# Patient Record
Sex: FEMALE | Race: BLACK OR AFRICAN AMERICAN | NOT HISPANIC OR LATINO
[De-identification: names, ages, dates, MRNs, and addresses within clinical notes are randomized per-mention and may not be internally consistent; named-entity substitution may affect disease eponyms.]

---

## 2019-04-03 ENCOUNTER — RESULT REVIEW (OUTPATIENT)
Age: 34
End: 2019-04-03

## 2022-02-20 ENCOUNTER — EMERGENCY (EMERGENCY)
Facility: HOSPITAL | Age: 37
LOS: 0 days | Discharge: HOME | End: 2022-02-20
Attending: EMERGENCY MEDICINE
Payer: COMMERCIAL

## 2022-02-20 VITALS
SYSTOLIC BLOOD PRESSURE: 123 MMHG | RESPIRATION RATE: 16 BRPM | HEIGHT: 66 IN | DIASTOLIC BLOOD PRESSURE: 84 MMHG | OXYGEN SATURATION: 97 % | HEART RATE: 84 BPM | WEIGHT: 136.91 LBS | TEMPERATURE: 99 F

## 2022-02-20 VITALS
OXYGEN SATURATION: 98 % | SYSTOLIC BLOOD PRESSURE: 119 MMHG | HEART RATE: 70 BPM | TEMPERATURE: 98 F | DIASTOLIC BLOOD PRESSURE: 79 MMHG | RESPIRATION RATE: 17 BRPM

## 2022-02-20 DIAGNOSIS — O03.9 COMPLETE OR UNSPECIFIED SPONTANEOUS ABORTION WITHOUT COMPLICATION: ICD-10-CM

## 2022-02-20 DIAGNOSIS — O20.9 HEMORRHAGE IN EARLY PREGNANCY, UNSPECIFIED: ICD-10-CM

## 2022-02-20 DIAGNOSIS — R10.30 LOWER ABDOMINAL PAIN, UNSPECIFIED: ICD-10-CM

## 2022-02-20 LAB
ALBUMIN SERPL ELPH-MCNC: 3.7 G/DL — SIGNIFICANT CHANGE UP (ref 3.3–5)
ALP SERPL-CCNC: 67 U/L — SIGNIFICANT CHANGE UP (ref 40–120)
ALT FLD-CCNC: 21 U/L — SIGNIFICANT CHANGE UP (ref 12–78)
ANION GAP SERPL CALC-SCNC: 5 MMOL/L — SIGNIFICANT CHANGE UP (ref 5–17)
AST SERPL-CCNC: 19 U/L — SIGNIFICANT CHANGE UP (ref 15–37)
BASOPHILS # BLD AUTO: 0.05 K/UL — SIGNIFICANT CHANGE UP (ref 0–0.2)
BASOPHILS NFR BLD AUTO: 0.7 % — SIGNIFICANT CHANGE UP (ref 0–2)
BILIRUB SERPL-MCNC: 0.5 MG/DL — SIGNIFICANT CHANGE UP (ref 0.2–1.2)
BLD GP AB SCN SERPL QL: SIGNIFICANT CHANGE UP
BUN SERPL-MCNC: 9 MG/DL — SIGNIFICANT CHANGE UP (ref 7–23)
CALCIUM SERPL-MCNC: 8.9 MG/DL — SIGNIFICANT CHANGE UP (ref 8.5–10.1)
CHLORIDE SERPL-SCNC: 108 MMOL/L — SIGNIFICANT CHANGE UP (ref 96–108)
CO2 SERPL-SCNC: 25 MMOL/L — SIGNIFICANT CHANGE UP (ref 22–31)
CREAT SERPL-MCNC: 0.62 MG/DL — SIGNIFICANT CHANGE UP (ref 0.5–1.3)
EOSINOPHIL # BLD AUTO: 0.1 K/UL — SIGNIFICANT CHANGE UP (ref 0–0.5)
EOSINOPHIL NFR BLD AUTO: 1.4 % — SIGNIFICANT CHANGE UP (ref 0–6)
FLUAV AG NPH QL: SIGNIFICANT CHANGE UP
FLUBV AG NPH QL: SIGNIFICANT CHANGE UP
GLUCOSE SERPL-MCNC: 88 MG/DL — SIGNIFICANT CHANGE UP (ref 70–99)
HCG SERPL-ACNC: 808 MIU/ML — HIGH
HCT VFR BLD CALC: 38.6 % — SIGNIFICANT CHANGE UP (ref 34.5–45)
HGB BLD-MCNC: 12.9 G/DL — SIGNIFICANT CHANGE UP (ref 11.5–15.5)
IMM GRANULOCYTES NFR BLD AUTO: 0.3 % — SIGNIFICANT CHANGE UP (ref 0–1.5)
LYMPHOCYTES # BLD AUTO: 1.99 K/UL — SIGNIFICANT CHANGE UP (ref 1–3.3)
LYMPHOCYTES # BLD AUTO: 27.8 % — SIGNIFICANT CHANGE UP (ref 13–44)
MCHC RBC-ENTMCNC: 28 PG — SIGNIFICANT CHANGE UP (ref 27–34)
MCHC RBC-ENTMCNC: 33.4 G/DL — SIGNIFICANT CHANGE UP (ref 32–36)
MCV RBC AUTO: 83.7 FL — SIGNIFICANT CHANGE UP (ref 80–100)
MONOCYTES # BLD AUTO: 0.62 K/UL — SIGNIFICANT CHANGE UP (ref 0–0.9)
MONOCYTES NFR BLD AUTO: 8.7 % — SIGNIFICANT CHANGE UP (ref 2–14)
NEUTROPHILS # BLD AUTO: 4.37 K/UL — SIGNIFICANT CHANGE UP (ref 1.8–7.4)
NEUTROPHILS NFR BLD AUTO: 61.1 % — SIGNIFICANT CHANGE UP (ref 43–77)
NRBC # BLD: 0 /100 WBCS — SIGNIFICANT CHANGE UP (ref 0–0)
PLATELET # BLD AUTO: 297 K/UL — SIGNIFICANT CHANGE UP (ref 150–400)
POTASSIUM SERPL-MCNC: 4 MMOL/L — SIGNIFICANT CHANGE UP (ref 3.5–5.3)
POTASSIUM SERPL-SCNC: 4 MMOL/L — SIGNIFICANT CHANGE UP (ref 3.5–5.3)
PROT SERPL-MCNC: 7.5 GM/DL — SIGNIFICANT CHANGE UP (ref 6–8.3)
RBC # BLD: 4.61 M/UL — SIGNIFICANT CHANGE UP (ref 3.8–5.2)
RBC # FLD: 14.2 % — SIGNIFICANT CHANGE UP (ref 10.3–14.5)
SARS-COV-2 RNA SPEC QL NAA+PROBE: SIGNIFICANT CHANGE UP
SODIUM SERPL-SCNC: 138 MMOL/L — SIGNIFICANT CHANGE UP (ref 135–145)
WBC # BLD: 7.15 K/UL — SIGNIFICANT CHANGE UP (ref 3.8–10.5)
WBC # FLD AUTO: 7.15 K/UL — SIGNIFICANT CHANGE UP (ref 3.8–10.5)

## 2022-02-20 PROCEDURE — 99285 EMERGENCY DEPT VISIT HI MDM: CPT

## 2022-02-20 PROCEDURE — 76856 US EXAM PELVIC COMPLETE: CPT | Mod: 26

## 2022-02-20 RX ORDER — MIDAZOLAM HYDROCHLORIDE 1 MG/ML
4 INJECTION, SOLUTION INTRAMUSCULAR; INTRAVENOUS ONCE
Refills: 0 | Status: DISCONTINUED | OUTPATIENT
Start: 2022-02-20 | End: 2022-02-20

## 2022-02-20 RX ORDER — SODIUM CHLORIDE 9 MG/ML
1000 INJECTION INTRAMUSCULAR; INTRAVENOUS; SUBCUTANEOUS ONCE
Refills: 0 | Status: COMPLETED | OUTPATIENT
Start: 2022-02-20 | End: 2022-02-20

## 2022-02-20 RX ORDER — ACETAMINOPHEN 500 MG
975 TABLET ORAL ONCE
Refills: 0 | Status: DISCONTINUED | OUTPATIENT
Start: 2022-02-20 | End: 2022-02-20

## 2022-02-20 RX ADMIN — SODIUM CHLORIDE 1000 MILLILITER(S): 9 INJECTION INTRAMUSCULAR; INTRAVENOUS; SUBCUTANEOUS at 10:14

## 2022-02-20 NOTE — ED PROVIDER NOTE - CONSTITUTIONAL, MLM
normal... Well appearing, awake, alert, oriented to person, place, time/situation and in no apparent distress. speaking in clear full sentences no nasal flaring no shoulders retractions no diaphoresis smiling appears very comfortable sitting up in the stretcher in a bright light room

## 2022-02-20 NOTE — ED ADULT NURSE NOTE - NSSEPSISSUSPECTED_ED_A_ED
Discharge Instructions for Bone Marrow Aspiration and Biopsy    Home Care  Take it easy for the remainder of the day due to lingering effects of the sedation you were given. Your balance and reaction times may not be normal for the next 24 hours. Use extreme caution with ambulation and any other activities. Do not drive or drink alcoholic beverages and do not sign any legal documents for the next 24 hours. You may shower in the morning. Remove the bandage from the site and pat the area dry. You do not need to replace the bandage unless it is more comfortable for you to do so or there is drainage from the site.      Recovering at Home  Once at home, follow any instructions you have been given. Be sure to:  · Take all medications as directed.  · Care for the procedure site as instructed.  · Check for signs of infection at the procedure site (see below).    Notify your doctor or call the Radiology Nursing Department [phone] if you develop any of the following symptoms:  · Bleeding from the biopsy site  · Dizziness or lightheadedness  · Sudden or increased shortness of breath  · Sudden chest pain  · Fever above 100.4°F  · Shaking chills  · Increasing redness, tenderness, or swelling at the biopsy site  · Foul smelling drainage from the biopsy site  · Opening of the biopsy site  · Increasing pain, with or without activity     Follow-Up  The physician who ordered your procedure will be able to review the results with you   
No

## 2022-02-20 NOTE — ED PROVIDER NOTE - CLINICAL SUMMARY MEDICAL DECISION MAKING FREE TEXT BOX
hx, exam, labs, us of pelvis, reeval pt already started meds for  pt is advised to return for repeating hcg us pelvis shows cystic sac in the lower uterine.

## 2022-02-20 NOTE — ED ADULT TRIAGE NOTE - CHIEF COMPLAINT QUOTE
Pt pregnant LMP 1/6/22 OB said possible ectopic told go to hospital if gets pain + pain left side pt HCGs have been dropping + bleeding  1 pad since this AM

## 2022-02-20 NOTE — ED PROVIDER NOTE - PROGRESS NOTE DETAILS
Pt has been having no abd pain Pt is sitting up eating, Pt already used med to . us shows cystic sac in the lower uterine. Pt is advised to follow up  with her ob/gyn or return for repeating hcg in 2 to 3 days.

## 2022-02-20 NOTE — ED PROVIDER NOTE - NS ED ATTENDING STATEMENT MOD
HISTORY OF PRESENT ILLNESS    2 months status post calcaneus fracture.  Doing well.          PHYSICAL EXAM    Examination right foot demonstrates mild tenderness with palpation over her calcaneus.  Mild swelling.  Ankle motion +5-30.  Mild to moderate restriction of subtalar joint motion.      XR ADVOCATE PROCEDURE  Narrative: Accession #    [id number]    EXAM: XR HEEL/CALCANEUS RT MIN 2V    CLINICAL INDICATION: Painful calcaneal fracture, assess for healing.    COMPARISON: Right calcaneus radiographs dated 01/10/2019    FINDINGS: 2 views of the right calcaneus were obtained.  Anteroinferior calcaneal fracture is   better appreciated in the lateral image.  Lucent fracture line appears less conspicuous when   compared to prior study, suggesting interval healing.  Minimal cortical contour regularity   at the plantar aspect of the calcaneus is again noted.  Minimal enthesopathic change at the   Achilles tendon insertion and small calcaneal bone spur are again noted.  Impression: Lucent anteroinferior calcaneal fracture appears less conspicuous, suggesting   interval healing.    -----  F I N A L  -----    Transcribed By: SIOMARA   02/20/19 3:53 pm    Dictated By:            SANAZ, GRACIELA NEAL MD    Electronically Reviewed and Approved By:           SANAZ, GRACIELA NEAL MD  02/20/19 3:55 pm    Ortho Exam      ASSESSMENT/PLAN    Overall doing quite well.  Report confirmed progressive healing.  At this point may begin weightbearing as tolerated.  When ready made addition from boot to shoes.  Recheck of her motion in about 6 weeks.  No x-rays necessary at that time unless having problems   Attending Only

## 2022-02-20 NOTE — ED PROVIDER NOTE - PATIENT PORTAL LINK FT
You can access the FollowMyHealth Patient Portal offered by [facility] by registering at the following website: [email or web address]. By joining [employer]’s FollowMyHealth portal, you will also be able to view your health information using other applications (apps) compatible with our system.

## 2022-02-20 NOTE — ED PROVIDER NOTE - OBJECTIVE STATEMENT
36 years old female walked in c/o lower abd cramps intermittently with vaginal spotting more since last week Pt's lmp 22. Pt was seen by her ob/gyn on  hcg was 770.1 then on 22 hcg 674.1  and 2/15/22 857.1 pt also had sono of pelvis showed left side ovary was not clear possible left ectopic and pt was  prescribed  medication to insert into her vagina to induce contraction. Pt had three doses of Pfizer.

## 2022-02-20 NOTE — ED ADULT NURSE NOTE - OBJECTIVE STATEMENT
Pt A&Ox4, pt reports going through a miscarriage and being prescribed misoprostol. pt with increasing left side abdominal pain and was told by OB to follow up in the ER for possible ectopic pregnancy.  LMP 1/6/22

## 2022-06-08 ENCOUNTER — EMERGENCY (EMERGENCY)
Facility: HOSPITAL | Age: 37
LOS: 0 days | Discharge: HOME | End: 2022-06-09
Attending: STUDENT IN AN ORGANIZED HEALTH CARE EDUCATION/TRAINING PROGRAM
Payer: COMMERCIAL

## 2022-06-08 VITALS
RESPIRATION RATE: 18 BRPM | DIASTOLIC BLOOD PRESSURE: 81 MMHG | HEIGHT: 61 IN | SYSTOLIC BLOOD PRESSURE: 117 MMHG | WEIGHT: 145.95 LBS | OXYGEN SATURATION: 100 % | TEMPERATURE: 98 F | HEART RATE: 92 BPM

## 2022-06-08 DIAGNOSIS — Z3A.10 10 WEEKS GESTATION OF PREGNANCY: ICD-10-CM

## 2022-06-08 DIAGNOSIS — O20.8 OTHER HEMORRHAGE IN EARLY PREGNANCY: ICD-10-CM

## 2022-06-08 DIAGNOSIS — N93.9 ABNORMAL UTERINE AND VAGINAL BLEEDING, UNSPECIFIED: ICD-10-CM

## 2022-06-08 LAB
ALBUMIN SERPL ELPH-MCNC: 3.2 G/DL — LOW (ref 3.3–5)
ALP SERPL-CCNC: 64 U/L — SIGNIFICANT CHANGE UP (ref 40–120)
ALT FLD-CCNC: 27 U/L — SIGNIFICANT CHANGE UP (ref 12–78)
ANION GAP SERPL CALC-SCNC: 7 MMOL/L — SIGNIFICANT CHANGE UP (ref 5–17)
APTT BLD: 26.8 SEC — LOW (ref 27.5–35.5)
AST SERPL-CCNC: 25 U/L — SIGNIFICANT CHANGE UP (ref 15–37)
BASOPHILS # BLD AUTO: 0.05 K/UL — SIGNIFICANT CHANGE UP (ref 0–0.2)
BASOPHILS NFR BLD AUTO: 0.4 % — SIGNIFICANT CHANGE UP (ref 0–2)
BILIRUB SERPL-MCNC: 0.4 MG/DL — SIGNIFICANT CHANGE UP (ref 0.2–1.2)
BUN SERPL-MCNC: 11 MG/DL — SIGNIFICANT CHANGE UP (ref 7–23)
CALCIUM SERPL-MCNC: 8.9 MG/DL — SIGNIFICANT CHANGE UP (ref 8.5–10.1)
CHLORIDE SERPL-SCNC: 106 MMOL/L — SIGNIFICANT CHANGE UP (ref 96–108)
CO2 SERPL-SCNC: 25 MMOL/L — SIGNIFICANT CHANGE UP (ref 22–31)
CREAT SERPL-MCNC: 0.47 MG/DL — LOW (ref 0.5–1.3)
EGFR: 127 ML/MIN/1.73M2 — SIGNIFICANT CHANGE UP
EOSINOPHIL # BLD AUTO: 0.21 K/UL — SIGNIFICANT CHANGE UP (ref 0–0.5)
EOSINOPHIL NFR BLD AUTO: 1.6 % — SIGNIFICANT CHANGE UP (ref 0–6)
GLUCOSE SERPL-MCNC: 86 MG/DL — SIGNIFICANT CHANGE UP (ref 70–99)
HCG SERPL-ACNC: HIGH MIU/ML
HCT VFR BLD CALC: 34.1 % — LOW (ref 34.5–45)
HGB BLD-MCNC: 11.6 G/DL — SIGNIFICANT CHANGE UP (ref 11.5–15.5)
IMM GRANULOCYTES NFR BLD AUTO: 0.5 % — SIGNIFICANT CHANGE UP (ref 0–1.5)
INR BLD: 1.01 RATIO — SIGNIFICANT CHANGE UP (ref 0.88–1.16)
LYMPHOCYTES # BLD AUTO: 23.4 % — SIGNIFICANT CHANGE UP (ref 13–44)
LYMPHOCYTES # BLD AUTO: 3.01 K/UL — SIGNIFICANT CHANGE UP (ref 1–3.3)
MCHC RBC-ENTMCNC: 27.8 PG — SIGNIFICANT CHANGE UP (ref 27–34)
MCHC RBC-ENTMCNC: 34 G/DL — SIGNIFICANT CHANGE UP (ref 32–36)
MCV RBC AUTO: 81.6 FL — SIGNIFICANT CHANGE UP (ref 80–100)
MONOCYTES # BLD AUTO: 1.15 K/UL — HIGH (ref 0–0.9)
MONOCYTES NFR BLD AUTO: 8.9 % — SIGNIFICANT CHANGE UP (ref 2–14)
NEUTROPHILS # BLD AUTO: 8.4 K/UL — HIGH (ref 1.8–7.4)
NEUTROPHILS NFR BLD AUTO: 65.2 % — SIGNIFICANT CHANGE UP (ref 43–77)
NRBC # BLD: 0 /100 WBCS — SIGNIFICANT CHANGE UP (ref 0–0)
PLATELET # BLD AUTO: 290 K/UL — SIGNIFICANT CHANGE UP (ref 150–400)
POTASSIUM SERPL-MCNC: 3.8 MMOL/L — SIGNIFICANT CHANGE UP (ref 3.5–5.3)
POTASSIUM SERPL-SCNC: 3.8 MMOL/L — SIGNIFICANT CHANGE UP (ref 3.5–5.3)
PROT SERPL-MCNC: 6.7 GM/DL — SIGNIFICANT CHANGE UP (ref 6–8.3)
PROTHROM AB SERPL-ACNC: 12 SEC — SIGNIFICANT CHANGE UP (ref 10.5–13.4)
RBC # BLD: 4.18 M/UL — SIGNIFICANT CHANGE UP (ref 3.8–5.2)
RBC # FLD: 14.7 % — HIGH (ref 10.3–14.5)
SODIUM SERPL-SCNC: 138 MMOL/L — SIGNIFICANT CHANGE UP (ref 135–145)
WBC # BLD: 12.89 K/UL — HIGH (ref 3.8–10.5)
WBC # FLD AUTO: 12.89 K/UL — HIGH (ref 3.8–10.5)

## 2022-06-08 PROCEDURE — 99285 EMERGENCY DEPT VISIT HI MDM: CPT

## 2022-06-08 RX ORDER — SODIUM CHLORIDE 9 MG/ML
1000 INJECTION INTRAMUSCULAR; INTRAVENOUS; SUBCUTANEOUS ONCE
Refills: 0 | Status: DISCONTINUED | OUTPATIENT
Start: 2022-06-08 | End: 2022-06-08

## 2022-06-08 NOTE — ED PROVIDER NOTE - CLINICAL SUMMARY MEDICAL DECISION MAKING FREE TEXT BOX
This pregnant patient presents with vaginal bleeding in the first trimester. DDX includes ectopic pregnancy, intrauterine pregnancy, threatend/inevitable , complete , gestational trophoblastic disease, implantation bleeding, molar pregnancy, fibroids. The patient is hemodynamically stable and without a history of coagulopathy or infectious symptoms. Clinically the patient is well appearing, with no indications for transfusion and no signs/sx of peritonitis. Doubt alternate acute emergent pathology given HPI and physical exam.  PLAN:  - CBC, CMP, beta-HCG, UA/UCx, Type and screen  - Pelvic exam to determine cervical os open or closed.   - TV US showed:   - Pain control PRN, antiemetics PRN   - RhoGAM was NOT given as RH status was positive  - Extensive discussion w/ the patient about the possible etiologies of vaginal bleeding and answered all their questions. The patient is agreeable to outpatient OB/GYN for a 48 hour Beta-HCG. I encouraged them to call if they have any questions and return to the ED for any worsening symptoms.

## 2022-06-08 NOTE — ED PROVIDER NOTE - PROGRESS NOTE DETAILS
(+) subchorionic hematoma likely cause of vaginal bleeding, IUP confirmed, FHT (+), recommended to follow up with primary obgyn in AM. I have discussed with the patient about the ED workup, lab results, diagnostics results, plan for discharge home, need for follow-up with primary care physician/specialists, and return precautions. At this time, the patient does not require further workup in the ED. The patient is subjectively feeling better and would like to be discharged home. The patient had the opportunity to ask questions and I have answered all inquiries. The patient verbalizes understanding and agreement with the plan. The patient is hemodynamically stable, clinically well-appearing, ambulatory, mentating well and ready for discharge home.

## 2022-06-08 NOTE — ED PROVIDER NOTE - PATIENT PORTAL LINK FT
You can access the FollowMyHealth Patient Portal offered by [facility] by registering at the following website: [email or web address]. By joining [employer]’s FollowMyHealth portal, you will also be able to view your health information using other applications (apps) compatible with our system.

## 2022-06-08 NOTE — ED ADULT NURSE NOTE - OBJECTIVE STATEMENT
Received pt in ED, AOx3, 11 weeks pregnant, c/o vaginal bleeding today. Pt denied  abd pain, fever, chills, cough. Speaks full sentences, unlabored breathing on RA.

## 2022-06-08 NOTE — ED PROVIDER NOTE - OBJECTIVE STATEMENT
37F  (1 miscarriage) currently 11 weeks gestation, presenting with vaginal bleeding today. Describes one episode of large vaginal bleeding w/ clots and possible tissue. Denies any abdominal pain, fevers, chills, nausea/vomiting, vaginal discharge, dysuria, hematuria, headaches, syncope, visual complaints, diarrhea, constipation, chest pain, shortness of breath.

## 2022-06-09 VITALS
OXYGEN SATURATION: 97 % | RESPIRATION RATE: 18 BRPM | DIASTOLIC BLOOD PRESSURE: 64 MMHG | SYSTOLIC BLOOD PRESSURE: 112 MMHG | HEART RATE: 74 BPM | TEMPERATURE: 99 F

## 2022-06-09 LAB
BLD GP AB SCN SERPL QL: SIGNIFICANT CHANGE UP

## 2022-06-09 PROCEDURE — 76830 TRANSVAGINAL US NON-OB: CPT | Mod: 26,59

## 2022-08-24 NOTE — ED ADULT NURSE NOTE - CHIEF COMPLAINT
-Patient currently recovering from COVID infection  -Symptoms are improving but she is having some vertigo  -Will recommend trial of meclizine for symptom relief  -For persistent symptoms recommend in-person office evaluation  -May benefit from vestibular therapy The patient is a 37y Female complaining of vaginal bleeding.

## 2022-11-29 PROBLEM — Z00.00 ENCOUNTER FOR PREVENTIVE HEALTH EXAMINATION: Status: ACTIVE | Noted: 2022-11-29

## 2022-12-09 ENCOUNTER — TRANSCRIPTION ENCOUNTER (OUTPATIENT)
Age: 37
End: 2022-12-09

## 2022-12-09 ENCOUNTER — APPOINTMENT (OUTPATIENT)
Dept: OBGYN | Facility: CLINIC | Age: 37
End: 2022-12-09

## 2022-12-09 VITALS
DIASTOLIC BLOOD PRESSURE: 76 MMHG | HEART RATE: 96 BPM | WEIGHT: 183 LBS | HEIGHT: 61 IN | BODY MASS INDEX: 34.55 KG/M2 | SYSTOLIC BLOOD PRESSURE: 121 MMHG

## 2022-12-09 VITALS
WEIGHT: 183 LBS | HEIGHT: 62.2 IN | SYSTOLIC BLOOD PRESSURE: 132 MMHG | BODY MASS INDEX: 33.25 KG/M2 | DIASTOLIC BLOOD PRESSURE: 88 MMHG | HEART RATE: 84 BPM

## 2022-12-09 VITALS — DIASTOLIC BLOOD PRESSURE: 76 MMHG | HEART RATE: 96 BPM | SYSTOLIC BLOOD PRESSURE: 121 MMHG

## 2022-12-09 PROCEDURE — 0500F INITIAL PRENATAL CARE VISIT: CPT

## 2022-12-12 LAB
ABO + RH PNL BLD: NORMAL
APTT BLD: 25.9 SEC
BACTERIA UR CULT: NORMAL
BASOPHILS # BLD AUTO: 0.02 K/UL
BASOPHILS NFR BLD AUTO: 0.2 %
BLD GP AB SCN SERPL QL: NORMAL
EOSINOPHIL # BLD AUTO: 0.08 K/UL
EOSINOPHIL NFR BLD AUTO: 0.8 %
FACT VII ACT/NOR PPP: 108 %
GP B STREP DNA SPEC QL NAA+PROBE: NOT DETECTED
HCT VFR BLD CALC: 33.6 %
HGB BLD-MCNC: 10.3 G/DL
HIV1+2 AB SPEC QL IA.RAPID: NONREACTIVE
IMM GRANULOCYTES NFR BLD AUTO: 0.6 %
INR PPP: 1.02 RATIO
LYMPHOCYTES # BLD AUTO: 2 K/UL
LYMPHOCYTES NFR BLD AUTO: 21.2 %
MAN DIFF?: NORMAL
MCHC RBC-ENTMCNC: 23.7 PG
MCHC RBC-ENTMCNC: 30.7 GM/DL
MCV RBC AUTO: 77.2 FL
MONOCYTES # BLD AUTO: 1.32 K/UL
MONOCYTES NFR BLD AUTO: 14 %
NEUTROPHILS # BLD AUTO: 5.96 K/UL
NEUTROPHILS NFR BLD AUTO: 63.2 %
PLATELET # BLD AUTO: 288 K/UL
PT BLD: 12 SEC
RBC # BLD: 4.35 M/UL
RBC # FLD: 18.5 %
SOURCE GBS: NORMAL
T PALLIDUM AB SER QL IA: NEGATIVE
WBC # FLD AUTO: 9.44 K/UL

## 2022-12-16 ENCOUNTER — NON-APPOINTMENT (OUTPATIENT)
Age: 37
End: 2022-12-16

## 2022-12-16 ENCOUNTER — APPOINTMENT (OUTPATIENT)
Dept: OBGYN | Facility: CLINIC | Age: 37
End: 2022-12-16

## 2022-12-16 VITALS — DIASTOLIC BLOOD PRESSURE: 85 MMHG | HEART RATE: 97 BPM | SYSTOLIC BLOOD PRESSURE: 143 MMHG

## 2022-12-16 VITALS
WEIGHT: 184 LBS | HEART RATE: 108 BPM | DIASTOLIC BLOOD PRESSURE: 84 MMHG | BODY MASS INDEX: 34.74 KG/M2 | SYSTOLIC BLOOD PRESSURE: 143 MMHG | HEIGHT: 61 IN

## 2022-12-16 VITALS — HEART RATE: 108 BPM | SYSTOLIC BLOOD PRESSURE: 127 MMHG | DIASTOLIC BLOOD PRESSURE: 83 MMHG

## 2022-12-16 DIAGNOSIS — O09.523 SUPERVISION OF ELDERLY MULTIGRAVIDA, THIRD TRIMESTER: ICD-10-CM

## 2022-12-16 DIAGNOSIS — D68.2 HEREDITARY DEFICIENCY OF OTHER CLOTTING FACTORS: ICD-10-CM

## 2022-12-16 PROCEDURE — 0502F SUBSEQUENT PRENATAL CARE: CPT

## 2022-12-19 ENCOUNTER — INPATIENT (INPATIENT)
Facility: HOSPITAL | Age: 37
LOS: 3 days | Discharge: HOME | End: 2022-12-23
Attending: STUDENT IN AN ORGANIZED HEALTH CARE EDUCATION/TRAINING PROGRAM | Admitting: STUDENT IN AN ORGANIZED HEALTH CARE EDUCATION/TRAINING PROGRAM

## 2022-12-19 ENCOUNTER — APPOINTMENT (OUTPATIENT)
Dept: ANTEPARTUM | Facility: CLINIC | Age: 37
End: 2022-12-19

## 2022-12-19 ENCOUNTER — TRANSCRIPTION ENCOUNTER (OUTPATIENT)
Age: 37
End: 2022-12-19

## 2022-12-19 ENCOUNTER — ASOB RESULT (OUTPATIENT)
Age: 37
End: 2022-12-19

## 2022-12-19 ENCOUNTER — NON-APPOINTMENT (OUTPATIENT)
Age: 37
End: 2022-12-19

## 2022-12-19 VITALS
TEMPERATURE: 99 F | RESPIRATION RATE: 17 BRPM | HEART RATE: 84 BPM | DIASTOLIC BLOOD PRESSURE: 74 MMHG | SYSTOLIC BLOOD PRESSURE: 141 MMHG

## 2022-12-19 DIAGNOSIS — O13.9 GESTATIONAL [PREGNANCY-INDUCED] HYPERTENSION WITHOUT SIGNIFICANT PROTEINURIA, UNSPECIFIED TRIMESTER: ICD-10-CM

## 2022-12-19 DIAGNOSIS — O26.899 OTHER SPECIFIED PREGNANCY RELATED CONDITIONS, UNSPECIFIED TRIMESTER: ICD-10-CM

## 2022-12-19 LAB
ALBUMIN SERPL ELPH-MCNC: 3.6 G/DL — SIGNIFICANT CHANGE UP (ref 3.3–5)
ALP SERPL-CCNC: 283 U/L — HIGH (ref 40–120)
ALT FLD-CCNC: 10 U/L — SIGNIFICANT CHANGE UP (ref 4–33)
AMNISURE ROM (RUPTURE OF MEMBRANES): NEGATIVE — SIGNIFICANT CHANGE UP
ANION GAP SERPL CALC-SCNC: 8 MMOL/L — SIGNIFICANT CHANGE UP (ref 7–14)
APPEARANCE UR: CLEAR — SIGNIFICANT CHANGE UP
APTT BLD: 25 SEC — LOW (ref 27–36.3)
AST SERPL-CCNC: 21 U/L — SIGNIFICANT CHANGE UP (ref 4–32)
BASOPHILS # BLD AUTO: 0.04 K/UL — SIGNIFICANT CHANGE UP (ref 0–0.2)
BASOPHILS NFR BLD AUTO: 0.5 % — SIGNIFICANT CHANGE UP (ref 0–2)
BILIRUB SERPL-MCNC: 0.4 MG/DL — SIGNIFICANT CHANGE UP (ref 0.2–1.2)
BILIRUB UR-MCNC: NEGATIVE — SIGNIFICANT CHANGE UP
BLD GP AB SCN SERPL QL: NEGATIVE — SIGNIFICANT CHANGE UP
BUN SERPL-MCNC: 8 MG/DL — SIGNIFICANT CHANGE UP (ref 7–23)
CALCIUM SERPL-MCNC: 9.3 MG/DL — SIGNIFICANT CHANGE UP (ref 8.4–10.5)
CHLORIDE SERPL-SCNC: 103 MMOL/L — SIGNIFICANT CHANGE UP (ref 98–107)
CO2 SERPL-SCNC: 24 MMOL/L — SIGNIFICANT CHANGE UP (ref 22–31)
COLOR SPEC: SIGNIFICANT CHANGE UP
CREAT ?TM UR-MCNC: 54 MG/DL — SIGNIFICANT CHANGE UP
CREAT SERPL-MCNC: 0.47 MG/DL — LOW (ref 0.5–1.3)
DIFF PNL FLD: NEGATIVE — SIGNIFICANT CHANGE UP
EGFR: 127 ML/MIN/1.73M2 — SIGNIFICANT CHANGE UP
EOSINOPHIL # BLD AUTO: 0.12 K/UL — SIGNIFICANT CHANGE UP (ref 0–0.5)
EOSINOPHIL NFR BLD AUTO: 1.4 % — SIGNIFICANT CHANGE UP (ref 0–6)
FIBRINOGEN PPP-MCNC: 495 MG/DL — HIGH (ref 200–465)
GLUCOSE SERPL-MCNC: 86 MG/DL — SIGNIFICANT CHANGE UP (ref 70–99)
GLUCOSE UR QL: NEGATIVE — SIGNIFICANT CHANGE UP
HCT VFR BLD CALC: 35.4 % — SIGNIFICANT CHANGE UP (ref 34.5–45)
HGB BLD-MCNC: 11 G/DL — LOW (ref 11.5–15.5)
IANC: 5.11 K/UL — SIGNIFICANT CHANGE UP (ref 1.8–7.4)
IMM GRANULOCYTES NFR BLD AUTO: 0.5 % — SIGNIFICANT CHANGE UP (ref 0–0.9)
INR BLD: 1.01 RATIO — SIGNIFICANT CHANGE UP (ref 0.88–1.16)
KETONES UR-MCNC: NEGATIVE — SIGNIFICANT CHANGE UP
LDH SERPL L TO P-CCNC: 228 U/L — HIGH (ref 135–225)
LEUKOCYTE ESTERASE UR-ACNC: NEGATIVE — SIGNIFICANT CHANGE UP
LYMPHOCYTES # BLD AUTO: 2.18 K/UL — SIGNIFICANT CHANGE UP (ref 1–3.3)
LYMPHOCYTES # BLD AUTO: 26.1 % — SIGNIFICANT CHANGE UP (ref 13–44)
MCHC RBC-ENTMCNC: 23.9 PG — LOW (ref 27–34)
MCHC RBC-ENTMCNC: 31.1 GM/DL — LOW (ref 32–36)
MCV RBC AUTO: 77 FL — LOW (ref 80–100)
MONOCYTES # BLD AUTO: 0.86 K/UL — SIGNIFICANT CHANGE UP (ref 0–0.9)
MONOCYTES NFR BLD AUTO: 10.3 % — SIGNIFICANT CHANGE UP (ref 2–14)
NEUTROPHILS # BLD AUTO: 5.11 K/UL — SIGNIFICANT CHANGE UP (ref 1.8–7.4)
NEUTROPHILS NFR BLD AUTO: 61.2 % — SIGNIFICANT CHANGE UP (ref 43–77)
NITRITE UR-MCNC: NEGATIVE — SIGNIFICANT CHANGE UP
NRBC # BLD: 0 /100 WBCS — SIGNIFICANT CHANGE UP (ref 0–0)
NRBC # FLD: 0 K/UL — SIGNIFICANT CHANGE UP (ref 0–0)
PH UR: 6.5 — SIGNIFICANT CHANGE UP (ref 5–8)
PLATELET # BLD AUTO: 309 K/UL — SIGNIFICANT CHANGE UP (ref 150–400)
POTASSIUM SERPL-MCNC: 4 MMOL/L — SIGNIFICANT CHANGE UP (ref 3.5–5.3)
POTASSIUM SERPL-SCNC: 4 MMOL/L — SIGNIFICANT CHANGE UP (ref 3.5–5.3)
PROT ?TM UR-MCNC: 10 MG/DL — SIGNIFICANT CHANGE UP
PROT SERPL-MCNC: 7.1 G/DL — SIGNIFICANT CHANGE UP (ref 6–8.3)
PROT UR-MCNC: NEGATIVE — SIGNIFICANT CHANGE UP
PROT/CREAT UR-RTO: 0.2 RATIO — SIGNIFICANT CHANGE UP (ref 0–0.2)
PROTHROM AB SERPL-ACNC: 11.7 SEC — SIGNIFICANT CHANGE UP (ref 10.5–13.4)
RBC # BLD: 4.6 M/UL — SIGNIFICANT CHANGE UP (ref 3.8–5.2)
RBC # FLD: 17.9 % — HIGH (ref 10.3–14.5)
RH IG SCN BLD-IMP: POSITIVE — SIGNIFICANT CHANGE UP
RH IG SCN BLD-IMP: POSITIVE — SIGNIFICANT CHANGE UP
SARS-COV-2 RNA SPEC QL NAA+PROBE: SIGNIFICANT CHANGE UP
SODIUM SERPL-SCNC: 135 MMOL/L — SIGNIFICANT CHANGE UP (ref 135–145)
SP GR SPEC: 1.01 — SIGNIFICANT CHANGE UP (ref 1.01–1.05)
URATE SERPL-MCNC: 3.4 MG/DL — SIGNIFICANT CHANGE UP (ref 2.5–7)
UROBILINOGEN FLD QL: SIGNIFICANT CHANGE UP
WBC # BLD: 8.35 K/UL — SIGNIFICANT CHANGE UP (ref 3.8–10.5)
WBC # FLD AUTO: 8.35 K/UL — SIGNIFICANT CHANGE UP (ref 3.8–10.5)

## 2022-12-19 PROCEDURE — 99202 OFFICE O/P NEW SF 15 MIN: CPT | Mod: 25

## 2022-12-19 PROCEDURE — 76805 OB US >/= 14 WKS SNGL FETUS: CPT

## 2022-12-19 PROCEDURE — 76818 FETAL BIOPHYS PROFILE W/NST: CPT

## 2022-12-19 RX ORDER — OXYTOCIN 10 UNIT/ML
333.33 VIAL (ML) INJECTION
Qty: 20 | Refills: 0 | Status: DISCONTINUED | OUTPATIENT
Start: 2022-12-19 | End: 2022-12-20

## 2022-12-19 RX ORDER — CHLORHEXIDINE GLUCONATE 213 G/1000ML
1 SOLUTION TOPICAL ONCE
Refills: 0 | Status: DISCONTINUED | OUTPATIENT
Start: 2022-12-19 | End: 2022-12-20

## 2022-12-19 RX ORDER — SODIUM CHLORIDE 9 MG/ML
1000 INJECTION, SOLUTION INTRAVENOUS
Refills: 0 | Status: DISCONTINUED | OUTPATIENT
Start: 2022-12-19 | End: 2022-12-20

## 2022-12-19 NOTE — OB RN PATIENT PROFILE - FALL HARM RISK - UNIVERSAL INTERVENTIONS
Bed in lowest position, wheels locked, appropriate side rails in place/Call bell, personal items and telephone in reach/Instruct patient to call for assistance before getting out of bed or chair/Non-slip footwear when patient is out of bed/[place] to call system/Physically safe environment - no spills, clutter or unnecessary equipment/Purposeful Proactive Rounding/Room/bathroom lighting operational, light cord in reach

## 2022-12-19 NOTE — OB PROVIDER H&P - PROBLEM SELECTOR PLAN 1
36 y/o , EDC , [place] 37.3 weeks, admit for induction due to gestational hypertension.  -Pt had elevated BP's today up to 169/85, and previous record of elevated BP in office on  of 143/84  -Admit to Labor and Delivery, discussed with Dr. Shirley  -Admission Consents obtained  -Covid specimen obtained  -GBS negative   -PO Cytotec

## 2022-12-19 NOTE — OB RN PATIENT PROFILE - NAME OF FATHER, OB PROFILE
-- DO NOT REPLY / DO NOT REPLY ALL --  -- Message is from the [facility]--    Transfer to RN      Chief Complaint:  STOMACH DISCOMFORT FROM MEDICATION VOMIT     Caller Information       Type Contact Phone    12/09/2021 07:46 PM CST Phone (Incoming) Sandy Talavera (Self) [phone] (M)          Provider Name:   CALDERON HIDALGO Practice Site Name:  [facility] - [address] & [address]     Alternative Phone Number:  [phone]       Von Epstein

## 2022-12-19 NOTE — OB PROVIDER H&P - ASSESSMENT
38 y/o , EDC , [place] 37.3 weeks, admit for induction due to gestational hypertension.  -Pt had elevated BP's today up to 169/85, and previous record of elevated BP in office on  of 143/84  -Admit to Labor and Delivery, discussed with Dr. Shirley  -Admission Consents obtained  -Covid specimen obtained  -GBS negative   -PO Cytotec   38 y/o , EDC , [place] 37.3 weeks, admit for induction of labor for gestational hypertension.  -Pt had elevated BP's today up to 169/85, and previous record of elevated BP in office on  of 143/84  -Admit to Labor and Delivery, discussed with Dr. Shirley  -Admission Consents obtained  -Covid specimen obtained  -GBS negative   -PO Cytotec

## 2022-12-19 NOTE — OB PROVIDER TRIAGE NOTE - NSHPPHYSICALEXAM_GEN_ALL_CORE
Vital Signs Last 24 Hrs  T(C): 37 (19 Dec 2022 15:48), Max: 37 (19 Dec 2022 15:48)  T(F): 98.6 (19 Dec 2022 15:48), Max: 98.6 (19 Dec 2022 15:48)  HR: 63 (19 Dec 2022 18:34) (63 - 84)  BP: 141/75 (19 Dec 2022 18:34) (134/81 - 157/87)  RR: 17 (19 Dec 2022 15:48) (17 - 17)    SSE: os appears open, no pooling of fluid visualized in vault, scant blood in vault, nitrazine negative, fern negative, amnisure negative  TAS: vertex  FHR: 140 baseline, moderate variability, with accelerations, no decelerations  CTX: irregular  SVE: 1/20/-3  EFW: 3239

## 2022-12-19 NOTE — OB PROVIDER H&P - NSLOWPPHRISK_OBGYN_A_OB
No previous uterine incision/[place] Pregnancy/Less than or equal to 4 previous vaginal births/No known bleeding disorder/No history of postpartum hemorrhage/No other PPH risks indicated

## 2022-12-19 NOTE — OB RN PATIENT PROFILE - HIV: DATE, OB PROFILE
Date/Time of Note


Date/Time of Note


DATE: 2/14/19 


TIME: 09:47





Assessment/Plan


VTE Prophylaxis


Risk score (from [facility]0 risk:  4


SCD applied (from [facility]):  No


SCD contraindicated:  bilateral LE trauma


Pharmacological prophylaxis:  NA/contraindicated


Pharm contraindication:  other





Lines/Catheters


IV Catheter Type (from [facility]):  Saline Lock


Urinary Cath still in place:  No





Assessment/Plan


Hospital Course


Patient is awake alert participated in physical therapy however requires 2 


people person assist.


Assessment/Plan


-Acute stroke with right-sided weakness.  Continue aspirin and Lipitor.  


Continue PT and OT.  S/p evaluation by  Dr. Braden  in neurology 


consultation.  


-Encephalopathy secondary to acute stroke


-Diabetes mellitus type 2 with hemoglobin A1c 8.1.  Continue metformin and 


Tradjenta, NovoLog per mild algorithm sliding scale.


-Parkinson's disease, continue Sinemet at increased dose.  Status post 


reevaluation by Dr. Braden in neurology consultation.


-S/p UTI, completed treatment with Rocephin





Further recommendations based on clinical course.  Plan of care discussed with 


Dr. Win.


Results 24hrs





Laboratory Tests


  Test
            2/13/19
11:59  2/13/19
17:36  2/13/19
20:28  2/14/19
08:09


  Bedside Glucose          133            147            140            133








Exam/Review of Systems


Exam


Vitals





Vital Signs


  Date      Temp  Pulse  Resp  B/P (MAP)   Pulse Ox  O2          O2 Flow    FiO2


Time                                                 Delivery    Rate


   2/14/19  97.7     64    18      116/59        97  Room Air


     07:00                           (78)








Intake and Output





2/13/19 2/13/19 2/14/19





1515:00


23:00


07:00





IntakeIntake Total


820 ml


900 ml





BalanceBalance


820 ml


900 ml











Exam


Constitutional:  alert, oriented


Respiratory:  clear to auscultation


Cardiovascular:  nl pulse


Gastrointestinal:  soft, non-tender


Musculoskeletal:  nl extremities to inspection


Extremities:  normal pulses


Neurological:  other (R sided weakness)





Results


Results 24hrs





Laboratory Tests


  Test
            2/13/19
11:59  2/13/19
17:36  2/13/19
20:28  2/14/19
08:09


  Bedside Glucose          133            147            140            133








Medications


Medication





Current Medications


Aspirin (Halfprin) 81 mg DAILY PO  Last administered on 2/14/19at 08:34; Admin 


Dose 81 MG;  Start 2/1/19 at 09:00


Atorvastatin Calcium (Lipitor) 40 mg DAILY@21 PO  Last administered on 2/13/19at


20:25; Admin Dose 40 MG;  Start 1/31/19 at 22:30


Bisacodyl (Dulcolax) 10 mg DAILY PO  Last administered on 2/14/19at 08:35; Admin


Dose 10 MG;  Start 2/1/19 at 09:00


Clonidine (Catapres) 0.1 mg Q6H  PRN PO ELEVATED BLOOD PRESSURE;  Start 1/31/19 


at 23:00


Miscellaneous Information 1 ea NOTE XX ;  Start 1/31/19 at 23:00


Glucose (Glutose) 15 gm Q15M  PRN PO DECREASED GLUCOSE;  Start 1/31/19 at 23:00


Glucose (Glutose) 22.5 gm Q15M  PRN PO DECREASED GLUCOSE;  Start 1/31/19 at 


23:00


Dextrose (D50w Syringe) 25 ml Q15M  PRN IV DECREASED GLUCOSE;  Start 1/31/19 at 


23:00


Dextrose (D50w Syringe) 50 ml Q15M  PRN IV DECREASED GLUCOSE;  Start 1/31/19 at 


23:00


Glucagon (Glucagen) 1 mg Q15M  PRN IM DECREASED GLUCOSE;  Start 1/31/19 at 23:00


Glucose (Glutose) 15 gm Q15M  PRN BUCCAL DECREASED GLUCOSE;  Start 1/31/19 at 


23:00


Famotidine (Pepcid) 20 mg Q12 PO  Last administered on 2/14/19at 08:34; Admin 


Dose 20 MG;  Start 1/31/19 at 22:30


Insulin Aspart (Novolog Insulin Pen) (Adult SC Insulin - Moder... WITH MEALS  


BEDTIME SC  Last administered on 2/13/19at 17:39; Admin Dose 2 UNIT;  Start 


1/31/19 at 22:30


Diagnostic Test (Pha) (Accu-Chek) 1 ea 02 XX  Last administered on 2/6/19at 


02:37; Admin Dose 1 EA;  Start 2/1/19 at 02:00


Linagliptin (Tradjenta) 5 mg DAILY PO  Last administered on 2/14/19at 08:35; 


Admin Dose 5 MG;  Start 2/1/19 at 09:00


Metformin HCl (Glucophage) 500 mg BID WITH  MEALS PO  Last administered on 


2/14/19at 08:10; Admin Dose 500 MG;  Start 2/1/19 at 07:35


Ondansetron HCl (Zofran Inj) 4 mg Q6H  PRN IV NAUSEA AND/OR VOMITING;  Start 


1/31/19 at 23:00


Senna (Senokot) 1 tab HS PO  Last administered on 2/10/19at 20:42; Admin Dose 1 


TAB;  Start 2/1/19 at 21:00


Magnesium Hydroxide (Milk Of Mag) 30 ml BID  PRN PO CONSTIPATION Last 


administered on 2/1/19at 20:48; Admin Dose 30 ML;  Start 2/1/19 at 00:00


Lactulose (Enulose) 20 gm DAILY  PRN PO CONSTIPATION Last administered on 


2/2/19at 08:40; Admin Dose 20 GM;  Start 2/1/19 at 00:00


Acetaminophen (Tylenol Tab) 650 mg Q4H  PRN PO PAIN Last administered on 


2/5/19at 09:46; Admin Dose 650 MG;  Start 2/1/19 at 00:00


Miscellaneous Information (Pending Santyl Order For Wound Care) This patient 


ha... PRN  PRN XX WOUND CARE;  Start 2/1/19 at 00:00


Bisacodyl (Dulcolax Supp) 10 mg DAILY  PRN MO CONSTIPATION Last administered on 


2/3/19at 17:58; Admin Dose 10 MG;  Start 2/2/19 at 10:30


Sodium Biphosphate/ Sodium Phosphate (Fleet Enema) 133 ml DAILY  PRN MO 


CONSTIPATION;  Start 2/2/19 at 10:30


Polyethylene Glycol (Miralax) 17 gm DAILY  PRN PO CONSTIPATION Last administered


on 2/2/19at 12:31; Admin Dose 17 GM;  Start 2/2/19 at 10:30


Docusate Sodium (Colace) 100 mg BID PO  Last administered on 2/14/19at 08:34; 


Admin Dose 100 MG;  Start 2/3/19 at 09:00


Celecoxib (Celebrex) 100 mg DAILY  PRN PO pain Last administered on 2/13/19at 


12:14; Admin Dose 100 MG;  Start 2/5/19 at 11:30


Insulin Glargine (Lantus) 6 units DAILY@2000 SC  Last administered on 2/13/19at 


20:29; Admin Dose 6 UNITS;  Start 2/6/19 at 20:00


Carbidopa/Levodopa (Sinemet (25/ 100)) 1.5 tab TID PO  Last administered on 


2/14/19at 08:34; Admin Dose 1.5 TAB;  Start 2/13/19 at 09:00











LUCY CHEN             Feb 14, 2019 09:47 09-Dec-2022

## 2022-12-19 NOTE — OB PROVIDER H&P - NSHPPHYSICALEXAM_GEN_ALL_CORE
Vital Signs Last 24 Hrs  T(C): 37 (19 Dec 2022 15:48), Max: 37 (19 Dec 2022 15:48)  T(F): 98.6 (19 Dec 2022 15:48), Max: 98.6 (19 Dec 2022 15:48)  HR: 63 (19 Dec 2022 18:34) (63 - 84)  BP: 141/75 (19 Dec 2022 18:34) (134/81 - 157/87)  RR: 17 (19 Dec 2022 15:48) (17 - 17)    SSE: os appears open, no pooling of fluid visualized in vault, scant blood in vault, nitrazine negative, fern negative, amnisure negative  TAS: vertex  FHR: 140 baseline, moderate variability, with accelerations, no decelerations  CTX: irregular  SVE: 1/20/-3  EFW: 3239 Vital Signs Last 24 Hrs  T(C): 37 (19 Dec 2022 15:48), Max: 37 (19 Dec 2022 15:48)  T(F): 98.6 (19 Dec 2022 15:48), Max: 98.6 (19 Dec 2022 15:48)  HR: 63 (19 Dec 2022 18:34) (63 - 84)  BP: 141/75 (19 Dec 2022 18:34) (134/81 - 157/87)  RR: 17 (19 Dec 2022 15:48) (17 - 17)    SSE: os appears open, no pooling of fluid visualized in vault, scant blood in vault, nitrazine negative, fern negative, amnisure negative  TAS: vertex  FHR: 140 baseline, moderate variability, with accelerations, one deceleration while doing speculum exam  CTX: irregular  SVE: 1/20/-3  EFW: 3239

## 2022-12-19 NOTE — OB PROVIDER TRIAGE NOTE - NSOBPROVIDERNOTE_OBGYN_ALL_OB_FT
38 y/o , EDC , [place] 37.3 weeks, admit for induction due to gestational hypertension.  -Pt had elevated BP's today up to 169/85, and previous record of elevated BP in office on  of 143/84  -Admit to Labor and Delivery, discussed with Dr. Shirley  -Admission Consents obtained  -Covid specimen obtained  -GBS negative   -PO Cytotec

## 2022-12-19 NOTE — OB PROVIDER TRIAGE NOTE - HISTORY OF PRESENT ILLNESS
38 y/o , EDC , GA 37.3 weeks, presents from ATU to r/o ROM due to ORLY of 4 while doing growth scan today. Pt reports she has been feeling wetness for past few days, but no continuous flow. Pt reports inconsistent contractions x 1 week, pain 7/10 when has them, 0/10 pain now. Pt denies vaginal bleeding. Reports good FM. Denies lightheadedness, dizziness, blurred vision, scotomata, pain under right breast, nausea, vomiting.     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

## 2022-12-19 NOTE — OB PROVIDER TRIAGE NOTE - NS_OBGYNHISTORY_OBGYN_ALL_OB_FT
-06-, FT, M, 6-0 lbs, no complications  -12/10/09-, FT, 7-0 lbs, no complications  -2022- SAB x1, < 10 weeks, passed naturally  -denies history of fibroids, abnormal paps, STD's, herpes

## 2022-12-20 ENCOUNTER — TRANSCRIPTION ENCOUNTER (OUTPATIENT)
Age: 37
End: 2022-12-20

## 2022-12-20 LAB
ALBUMIN SERPL ELPH-MCNC: 3.1 G/DL — LOW (ref 3.3–5)
ALP SERPL-CCNC: 254 U/L — HIGH (ref 40–120)
ALT FLD-CCNC: 14 U/L — SIGNIFICANT CHANGE UP (ref 4–33)
ANION GAP SERPL CALC-SCNC: 10 MMOL/L — SIGNIFICANT CHANGE UP (ref 7–14)
APTT BLD: 24.6 SEC — LOW (ref 27–36.3)
AST SERPL-CCNC: 26 U/L — SIGNIFICANT CHANGE UP (ref 4–32)
BASOPHILS # BLD AUTO: 0.03 K/UL — SIGNIFICANT CHANGE UP (ref 0–0.2)
BASOPHILS NFR BLD AUTO: 0.2 % — SIGNIFICANT CHANGE UP (ref 0–2)
BILIRUB SERPL-MCNC: 0.2 MG/DL — SIGNIFICANT CHANGE UP (ref 0.2–1.2)
BUN SERPL-MCNC: 7 MG/DL — SIGNIFICANT CHANGE UP (ref 7–23)
CALCIUM SERPL-MCNC: 9.1 MG/DL — SIGNIFICANT CHANGE UP (ref 8.4–10.5)
CHLORIDE SERPL-SCNC: 103 MMOL/L — SIGNIFICANT CHANGE UP (ref 98–107)
CO2 SERPL-SCNC: 21 MMOL/L — LOW (ref 22–31)
COVID-19 SPIKE DOMAIN AB INTERP: POSITIVE
COVID-19 SPIKE DOMAIN ANTIBODY RESULT: >250 U/ML — HIGH
CREAT SERPL-MCNC: 0.5 MG/DL — SIGNIFICANT CHANGE UP (ref 0.5–1.3)
EGFR: 125 ML/MIN/1.73M2 — SIGNIFICANT CHANGE UP
EOSINOPHIL # BLD AUTO: 0.02 K/UL — SIGNIFICANT CHANGE UP (ref 0–0.5)
EOSINOPHIL NFR BLD AUTO: 0.1 % — SIGNIFICANT CHANGE UP (ref 0–6)
FIBRINOGEN PPP-MCNC: 427 MG/DL — SIGNIFICANT CHANGE UP (ref 200–465)
GLUCOSE SERPL-MCNC: 100 MG/DL — HIGH (ref 70–99)
HCT VFR BLD CALC: 34.2 % — LOW (ref 34.5–45)
HGB BLD-MCNC: 10.4 G/DL — LOW (ref 11.5–15.5)
IANC: 15.48 K/UL — HIGH (ref 1.8–7.4)
IMM GRANULOCYTES NFR BLD AUTO: 1.6 % — HIGH (ref 0–0.9)
INR BLD: 1.03 RATIO — SIGNIFICANT CHANGE UP (ref 0.88–1.16)
LDH SERPL L TO P-CCNC: 268 U/L — HIGH (ref 135–225)
LYMPHOCYTES # BLD AUTO: 1.4 K/UL — SIGNIFICANT CHANGE UP (ref 1–3.3)
LYMPHOCYTES # BLD AUTO: 7.8 % — LOW (ref 13–44)
MAGNESIUM SERPL-MCNC: 4.6 MG/DL — HIGH (ref 1.6–2.6)
MAGNESIUM SERPL-MCNC: 4.7 MG/DL — HIGH (ref 1.6–2.6)
MCHC RBC-ENTMCNC: 23.2 PG — LOW (ref 27–34)
MCHC RBC-ENTMCNC: 30.4 GM/DL — LOW (ref 32–36)
MCV RBC AUTO: 76.2 FL — LOW (ref 80–100)
MONOCYTES # BLD AUTO: 0.64 K/UL — SIGNIFICANT CHANGE UP (ref 0–0.9)
MONOCYTES NFR BLD AUTO: 3.6 % — SIGNIFICANT CHANGE UP (ref 2–14)
NEUTROPHILS # BLD AUTO: 15.48 K/UL — HIGH (ref 1.8–7.4)
NEUTROPHILS NFR BLD AUTO: 86.7 % — HIGH (ref 43–77)
NRBC # BLD: 0 /100 WBCS — SIGNIFICANT CHANGE UP (ref 0–0)
NRBC # FLD: 0 K/UL — SIGNIFICANT CHANGE UP (ref 0–0)
PLATELET # BLD AUTO: 302 K/UL — SIGNIFICANT CHANGE UP (ref 150–400)
POTASSIUM SERPL-MCNC: 4.3 MMOL/L — SIGNIFICANT CHANGE UP (ref 3.5–5.3)
POTASSIUM SERPL-SCNC: 4.3 MMOL/L — SIGNIFICANT CHANGE UP (ref 3.5–5.3)
PROT SERPL-MCNC: 6.2 G/DL — SIGNIFICANT CHANGE UP (ref 6–8.3)
PROTHROM AB SERPL-ACNC: 12 SEC — SIGNIFICANT CHANGE UP (ref 10.5–13.4)
RBC # BLD: 4.49 M/UL — SIGNIFICANT CHANGE UP (ref 3.8–5.2)
RBC # FLD: 17.5 % — HIGH (ref 10.3–14.5)
SARS-COV-2 IGG+IGM SERPL QL IA: >250 U/ML — HIGH
SARS-COV-2 IGG+IGM SERPL QL IA: POSITIVE
SODIUM SERPL-SCNC: 134 MMOL/L — LOW (ref 135–145)
T PALLIDUM AB TITR SER: NEGATIVE — SIGNIFICANT CHANGE UP
URATE SERPL-MCNC: 3.4 MG/DL — SIGNIFICANT CHANGE UP (ref 2.5–7)
WBC # BLD: 17.85 K/UL — HIGH (ref 3.8–10.5)
WBC # FLD AUTO: 17.85 K/UL — HIGH (ref 3.8–10.5)

## 2022-12-20 PROCEDURE — 59515 CESAREAN DELIVERY: CPT | Mod: U9,UB,GC

## 2022-12-20 RX ORDER — ONDANSETRON 8 MG/1
4 TABLET, FILM COATED ORAL EVERY 6 HOURS
Refills: 0 | Status: DISCONTINUED | OUTPATIENT
Start: 2022-12-20 | End: 2022-12-21

## 2022-12-20 RX ORDER — ACETAMINOPHEN 500 MG
975 TABLET ORAL
Refills: 0 | Status: DISCONTINUED | OUTPATIENT
Start: 2022-12-20 | End: 2022-12-23

## 2022-12-20 RX ORDER — DIPHENHYDRAMINE HCL 50 MG
25 CAPSULE ORAL EVERY 6 HOURS
Refills: 0 | Status: DISCONTINUED | OUTPATIENT
Start: 2022-12-20 | End: 2022-12-23

## 2022-12-20 RX ORDER — SODIUM CHLORIDE 9 MG/ML
500 INJECTION, SOLUTION INTRAVENOUS ONCE
Refills: 0 | Status: DISCONTINUED | OUTPATIENT
Start: 2022-12-20 | End: 2022-12-23

## 2022-12-20 RX ORDER — HYDROMORPHONE HYDROCHLORIDE 2 MG/ML
30 INJECTION INTRAMUSCULAR; INTRAVENOUS; SUBCUTANEOUS
Refills: 0 | Status: DISCONTINUED | OUTPATIENT
Start: 2022-12-20 | End: 2022-12-21

## 2022-12-20 RX ORDER — OXYCODONE HYDROCHLORIDE 5 MG/1
5 TABLET ORAL
Refills: 0 | Status: COMPLETED | OUTPATIENT
Start: 2022-12-20 | End: 2022-12-27

## 2022-12-20 RX ORDER — OXYTOCIN 10 UNIT/ML
333.33 VIAL (ML) INJECTION
Qty: 20 | Refills: 0 | Status: DISCONTINUED | OUTPATIENT
Start: 2022-12-20 | End: 2022-12-21

## 2022-12-20 RX ORDER — NALOXONE HYDROCHLORIDE 4 MG/.1ML
0.1 SPRAY NASAL
Refills: 0 | Status: DISCONTINUED | OUTPATIENT
Start: 2022-12-20 | End: 2022-12-21

## 2022-12-20 RX ORDER — ACETAMINOPHEN 500 MG
3 TABLET ORAL
Qty: 0 | Refills: 0 | DISCHARGE
Start: 2022-12-20

## 2022-12-20 RX ORDER — MAGNESIUM HYDROXIDE 400 MG/1
30 TABLET, CHEWABLE ORAL
Refills: 0 | Status: DISCONTINUED | OUTPATIENT
Start: 2022-12-20 | End: 2022-12-23

## 2022-12-20 RX ORDER — IBUPROFEN 200 MG
600 TABLET ORAL EVERY 6 HOURS
Refills: 0 | Status: COMPLETED | OUTPATIENT
Start: 2022-12-20 | End: 2023-11-18

## 2022-12-20 RX ORDER — KETOROLAC TROMETHAMINE 30 MG/ML
30 SYRINGE (ML) INJECTION EVERY 6 HOURS
Refills: 0 | Status: DISCONTINUED | OUTPATIENT
Start: 2022-12-20 | End: 2022-12-21

## 2022-12-20 RX ORDER — NIFEDIPINE 30 MG
1 TABLET, EXTENDED RELEASE 24 HR ORAL
Qty: 30 | Refills: 2
Start: 2022-12-20 | End: 2023-03-19

## 2022-12-20 RX ORDER — IBUPROFEN 200 MG
1 TABLET ORAL
Qty: 0 | Refills: 0 | DISCHARGE
Start: 2022-12-20

## 2022-12-20 RX ORDER — NIFEDIPINE 30 MG
10 TABLET, EXTENDED RELEASE 24 HR ORAL ONCE
Refills: 0 | Status: COMPLETED | OUTPATIENT
Start: 2022-12-20 | End: 2022-12-20

## 2022-12-20 RX ORDER — NIFEDIPINE 30 MG
30 TABLET, EXTENDED RELEASE 24 HR ORAL DAILY
Refills: 0 | Status: DISCONTINUED | OUTPATIENT
Start: 2022-12-20 | End: 2022-12-23

## 2022-12-20 RX ORDER — OXYCODONE HYDROCHLORIDE 5 MG/1
5 TABLET ORAL ONCE
Refills: 0 | Status: DISCONTINUED | OUTPATIENT
Start: 2022-12-20 | End: 2022-12-23

## 2022-12-20 RX ORDER — SODIUM CHLORIDE 9 MG/ML
1000 INJECTION, SOLUTION INTRAVENOUS
Refills: 0 | Status: DISCONTINUED | OUTPATIENT
Start: 2022-12-20 | End: 2022-12-20

## 2022-12-20 RX ORDER — LANOLIN
1 OINTMENT (GRAM) TOPICAL EVERY 6 HOURS
Refills: 0 | Status: DISCONTINUED | OUTPATIENT
Start: 2022-12-20 | End: 2022-12-23

## 2022-12-20 RX ORDER — TETANUS TOXOID, REDUCED DIPHTHERIA TOXOID AND ACELLULAR PERTUSSIS VACCINE, ADSORBED 5; 2.5; 8; 8; 2.5 [IU]/.5ML; [IU]/.5ML; UG/.5ML; UG/.5ML; UG/.5ML
0.5 SUSPENSION INTRAMUSCULAR ONCE
Refills: 0 | Status: DISCONTINUED | OUTPATIENT
Start: 2022-12-20 | End: 2022-12-23

## 2022-12-20 RX ORDER — HEPARIN SODIUM 5000 [USP'U]/ML
5000 INJECTION INTRAVENOUS; SUBCUTANEOUS EVERY 12 HOURS
Refills: 0 | Status: DISCONTINUED | OUTPATIENT
Start: 2022-12-20 | End: 2022-12-23

## 2022-12-20 RX ORDER — MAGNESIUM SULFATE 500 MG/ML
4 VIAL (ML) INJECTION ONCE
Refills: 0 | Status: COMPLETED | OUTPATIENT
Start: 2022-12-20 | End: 2022-12-20

## 2022-12-20 RX ORDER — SODIUM CHLORIDE 9 MG/ML
1000 INJECTION, SOLUTION INTRAVENOUS
Refills: 0 | Status: DISCONTINUED | OUTPATIENT
Start: 2022-12-20 | End: 2022-12-21

## 2022-12-20 RX ORDER — ERTAPENEM SODIUM 1 G/1
1000 INJECTION, POWDER, LYOPHILIZED, FOR SOLUTION INTRAMUSCULAR; INTRAVENOUS EVERY 24 HOURS
Refills: 0 | Status: DISCONTINUED | OUTPATIENT
Start: 2022-12-21 | End: 2022-12-21

## 2022-12-20 RX ORDER — HYDROMORPHONE HYDROCHLORIDE 2 MG/ML
0.5 INJECTION INTRAMUSCULAR; INTRAVENOUS; SUBCUTANEOUS
Refills: 0 | Status: DISCONTINUED | OUTPATIENT
Start: 2022-12-20 | End: 2022-12-21

## 2022-12-20 RX ORDER — ERTAPENEM SODIUM 1 G/1
1000 INJECTION, POWDER, LYOPHILIZED, FOR SOLUTION INTRAMUSCULAR; INTRAVENOUS ONCE
Refills: 0 | Status: COMPLETED | OUTPATIENT
Start: 2022-12-20 | End: 2022-12-20

## 2022-12-20 RX ORDER — ERTAPENEM SODIUM 1 G/1
INJECTION, POWDER, LYOPHILIZED, FOR SOLUTION INTRAMUSCULAR; INTRAVENOUS
Refills: 0 | Status: DISCONTINUED | OUTPATIENT
Start: 2022-12-20 | End: 2022-12-21

## 2022-12-20 RX ORDER — MAGNESIUM SULFATE 500 MG/ML
2 VIAL (ML) INJECTION
Qty: 40 | Refills: 0 | Status: DISCONTINUED | OUTPATIENT
Start: 2022-12-20 | End: 2022-12-21

## 2022-12-20 RX ORDER — OXYTOCIN 10 UNIT/ML
333.33 VIAL (ML) INJECTION
Qty: 20 | Refills: 0 | Status: DISCONTINUED | OUTPATIENT
Start: 2022-12-20 | End: 2022-12-20

## 2022-12-20 RX ORDER — SIMETHICONE 80 MG/1
80 TABLET, CHEWABLE ORAL EVERY 4 HOURS
Refills: 0 | Status: DISCONTINUED | OUTPATIENT
Start: 2022-12-20 | End: 2022-12-23

## 2022-12-20 RX ADMIN — Medication 975 MILLIGRAM(S): at 14:44

## 2022-12-20 RX ADMIN — HYDROMORPHONE HYDROCHLORIDE 30 MILLILITER(S): 2 INJECTION INTRAMUSCULAR; INTRAVENOUS; SUBCUTANEOUS at 13:53

## 2022-12-20 RX ADMIN — Medication 50 GM/HR: at 10:30

## 2022-12-20 RX ADMIN — Medication 30 MILLIGRAM(S): at 18:13

## 2022-12-20 RX ADMIN — HEPARIN SODIUM 5000 UNIT(S): 5000 INJECTION INTRAVENOUS; SUBCUTANEOUS at 14:44

## 2022-12-20 RX ADMIN — Medication 30 MILLIGRAM(S): at 10:30

## 2022-12-20 RX ADMIN — Medication 50 GM/HR: at 13:52

## 2022-12-20 RX ADMIN — Medication 10 MILLIGRAM(S): at 10:30

## 2022-12-20 RX ADMIN — SODIUM CHLORIDE 75 MILLILITER(S): 9 INJECTION, SOLUTION INTRAVENOUS at 08:10

## 2022-12-20 RX ADMIN — Medication 50 GM/HR: at 19:19

## 2022-12-20 RX ADMIN — HYDROMORPHONE HYDROCHLORIDE 30 MILLILITER(S): 2 INJECTION INTRAMUSCULAR; INTRAVENOUS; SUBCUTANEOUS at 11:41

## 2022-12-20 RX ADMIN — Medication 300 GRAM(S): at 10:07

## 2022-12-20 RX ADMIN — HYDROMORPHONE HYDROCHLORIDE 30 MILLILITER(S): 2 INJECTION INTRAMUSCULAR; INTRAVENOUS; SUBCUTANEOUS at 19:20

## 2022-12-20 RX ADMIN — Medication 975 MILLIGRAM(S): at 15:40

## 2022-12-20 RX ADMIN — Medication 975 MILLIGRAM(S): at 22:00

## 2022-12-20 RX ADMIN — ERTAPENEM SODIUM 1000 MILLIGRAM(S): 1 INJECTION, POWDER, LYOPHILIZED, FOR SOLUTION INTRAMUSCULAR; INTRAVENOUS at 08:54

## 2022-12-20 RX ADMIN — Medication 975 MILLIGRAM(S): at 21:31

## 2022-12-20 RX ADMIN — Medication 30 MILLIGRAM(S): at 18:28

## 2022-12-20 RX ADMIN — Medication 1000 MILLIUNIT(S)/MIN: at 08:10

## 2022-12-20 NOTE — OB RN INTRAOPERATIVE NOTE - NSSELHIDDEN_OBGYN_ALL_OB_FT
[NS_DeliveryAttending1_OBGYN_ALL_OB_FT:[id number]=],[NS_DeliveryRN_OBGYN_ALL_OB_FT:[id number]=]

## 2022-12-20 NOTE — DISCHARGE NOTE OB - CARE PROVIDER_API CALL
Matteo Patino)  OBSGYN  General  [address] [place] [address], [address]  [place], [place] [id number]  Phone: [phone]  Fax: [phone]  Follow Up Time:

## 2022-12-20 NOTE — OB PROVIDER DELIVERY SUMMARY - NSPROVIDERDELIVERYNOTE_OBGYN_ALL_OB_FT
emergency pLTCS 2/2 terminal bradycardia   Viable M infant, apgars 8/9, weight 2980g  Hysterotomy closed in 1 layer using caprosyn  Grossly normal uterus, tubes, and ovaries  Abdomen closed in standard fashion  Pt and infant to recovery in stable condition    EBL: 575    UOP: 400    IVF: 2300  [place]omney PGY-4     Dictation#: [id number] emergency pLTCS 2/2 terminal bradycardia   Viable M infant, apgars 8/9, weight 2980g  Hysterotomy closed in 1 layer using caprosyn  Grossly normal uterus, tubes, and ovaries  Abdomen closed in standard fashion  Pt and infant to recovery in stable condition    EBL: 575    UOP: 400    IVF: 2300  [place] PGY-4     ADD: came on at 7:00 and was taking over shift and patient was in process of having a bradycardia that was greater than 5 minutes so was taken to the back  Dictation#: [id number]

## 2022-12-20 NOTE — OB PROVIDER DELIVERY SUMMARY - NSSELHIDDEN_OBGYN_ALL_OB_FT
[NS_DeliveryAttending1_OBGYN_ALL_OB_FT:[id number]=],[NS_DeliveryRN_OBGYN_ALL_OB_FT:[id number]=]

## 2022-12-20 NOTE — CHART NOTE - NSCHARTNOTEFT_GEN_A_CORE
37 y.o  POD#0 s/p emergent C/S for terminal fetal bradycardia, s/p IOL for GHTN now meets criteria for sPEC. Labile BPs noted in pacu, followed by severe BPs 177/92 and 160/89 at 9am and 945am. Urine output wnl. Patient denies headache, visual changes, epigastric pain, RUQ pain, N/V, and dizziness.     Gen: NAD  Cardiac: RRR  Pulm: CTA B/L  Abdomen: soft, appropriately tender    +2 DTR b/l    Vital Signs Last 24 Hrs  T(C): 36.4 (20 Dec 2022 08:10), Max: 37.1 (19 Dec 2022 23:59)  T(F): 97.5 (20 Dec 2022 08:10), Max: 98.78 (19 Dec 2022 23:59)  HR: 81 (20 Dec 2022 10:15) (62 - 111)  BP: 136/80 (20 Dec 2022 10:15) (108/58 - 177/92)  BP(mean): 93 (20 Dec 2022 10:15) (77 - 108)  RR: 17 (20 Dec 2022 10:15) (15 - 21)  SpO2: 96% (20 Dec 2022 10:15) (93% - 100%)    Parameters below as of 19 Dec 2022 20:55  Patient On (Oxygen Delivery Method): room air    A/P  37 y.o POD#0 s/p emergent C/S for terminal fetal bradycardia, s/p IOL for GHTN now meets criteria for sPEC.    -Mag sulfate initiated  -stat HELLP labs ordered  -Procardia 10mg IR and Procardia 30mg XL daily ordered  -BPs now labile  -UOP adequate  -continue BP monitoring    d/w Dr. Salazar and Dr. Hackett PGY-4  Kim Lloyd NP

## 2022-12-20 NOTE — OB POSTPARTUM EVENT NOTE - NS_EVENTSUMMARY1_OBGYN_ALL_OB_FT
Patient s/p primary  under general anesthesia due to category II tracing. Patient was an induction for oligo and GHTN. Patients has labile BP's in 150's systolic and one severe BP of 177 Systolic. Patient asymptomatic. denies HA and blurred vision.

## 2022-12-20 NOTE — DISCHARGE NOTE OB - PATIENT PORTAL LINK FT
You can access the FollowMyHealth Patient Portal offered by [facility] by registering at the following website: [email or web address]. By joining [employer]’s FollowMyHealth portal, you will also be able to view your health information using other applications (apps) compatible with our system.

## 2022-12-20 NOTE — OB RN DELIVERY SUMMARY - NS_SEPSISRSKCALC_OBGYN_ALL_OB_FT
EOS calculated successfully. EOS Risk Factor: 0.5/1000 live births ([facility] national incidence); GA=37w4d; Temp=98.78; ROM=4.283; GBS='Negative'; Antibiotics='No antibiotics or any antibiotics < 2 hrs prior to birth'

## 2022-12-20 NOTE — OB PROVIDER LABOR PROGRESS NOTE - ASSESSMENT
38 y/o  @ 37.3 weeks admitted for IOL 2/2 gHTN & oligohydramnios ORLY 4     RN resuscitating patient sp LR bolus 1.3L on O2 via rebreather mask, no augmentation agent  Tracing reviewed by Dr Sommers  IUPC placed as per Dr Sommers for amnioinfusion  Pt felt lightheaded with immediate relief with left lying position and remains normotensive; anesthesia called to bedside denies sob/chest pain  Fetal bradycardia noted   ISE applied for closer monitoring  Dr Sommers at bedside  Terbutaline administered for tetanic ctx and OR opened  Dr Salazar arrived to bedside  Pt transferred to OR; plan for  section if no improvement in FHR d/w pt and partner     CRISTIN wallis      
@37.4wks gHTN IOL  Cervical change noted with clear fluid present  Cont EFM/TOCO  Cont PO cytotec    Jane NP  
36yo  at 37+4 iol for gHTN  - FHT appeared discontinuous, improved to baseline with proper application of monitor  - L-lateral recumbent  - Consider starting pitocin when able    MISTY Dyson ,PGY2

## 2022-12-20 NOTE — DISCHARGE NOTE OB - MEDICATION SUMMARY - MEDICATIONS TO TAKE
I will START or STAY ON the medications listed below when I get home from the hospital:    ibuprofen 600 mg oral tablet  -- 1 tab(s) by mouth every 6 hours  -- Indication: For Other pregnancy-related conditions, antepartum    acetaminophen 325 mg oral tablet  -- 3 tab(s) by mouth every 8 hours, As Needed  -- Indication: For Other pregnancy-related conditions, antepartum    NIFEdipine 30 mg oral tablet, extended release  -- 1 tab(s) by mouth once a day  -- Indication: For Gestational hypertension

## 2022-12-20 NOTE — OB PROVIDER LABOR PROGRESS NOTE - NS_SUBJECTIVE/OBJECTIVE_OBGYN_ALL_OB_FT
Patient seen for VE. S/p SROM @3a. Complaining of contraction pain 4/10, declining pain medication at this time.  VS  T(C): 37.1 (12-19-22 @ 23:59)  HR: 80 (12-19-22 @ 23:59)  BP: 108/58 (12-19-22 @ 23:59)  RR: 17 (12-19-22 @ 20:55)  SpO2: --

## 2022-12-20 NOTE — OB PROVIDER LABOR PROGRESS NOTE - NS_SUBJECTIVE/OBJECTIVE_OBGYN_ALL_OB_FT
Pt seen for cat II tracing.   since epidural ~445 am intermittent lates with moderate variability    Vital Signs Last 24 Hrs  T(C): 37.0 (20 Dec 2022 03:57), Max: 37.1 (19 Dec 2022 23:59)  T(F): 98.6 (20 Dec 2022 03:57), Max: 98.78 (19 Dec 2022 23:59)  HR: 72 (20 Dec 2022 07:02) (62 - 94)  BP: 142/58 (20 Dec 2022 06:57) (108/58 - 157/87)  RR: 17 (19 Dec 2022 20:55) (17 - 17)  SpO2: 94% (20 Dec 2022 07:02) (93% - 100%)  Parameters below as of 19 Dec 2022 20:55  Patient On (Oxygen Delivery Method): room air

## 2022-12-20 NOTE — OB NEONATOLOGY/PEDIATRICIAN DELIVERY SUMMARY - NSPEDSNEONOTESA_OBGYN_ALL_OB_FT
resuscitation team called for crash CS for fetal bradycardia. 37.4 wk male born via CS to a 36 y/o  blood type O+ mother. 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 Prenatal history of gHTN and oligohydramnios. PNL -/-/NR/I, GBS - on . SROM at 03:00 () with clear fluids, approx. 4 hrs. Baby emerged vigorous, crying, was w/d/s/s with APGARS of 8/9. Mom plans to initiate breastfeeding, consents Hep B vaccine and consents circ.  EOS 0.16. Maternal COVID negative. Baby is stable and admitted to NBN.

## 2022-12-20 NOTE — OB PROVIDER DELIVERY SUMMARY - NSATTENDATTESTATION_OBGYN_A_OB
Daughter called in regards to pts meds refill. Theyre still waiting for the refill and pt is in need of it. Please advise.    I was physically present and participated in this delivery.

## 2022-12-20 NOTE — DISCHARGE NOTE OB - CARE PLAN
Principal Discharge DX:	Gestational hypertension, third trimester  Assessment and plan of treatment:	pelvic rest x 6 weeks  Secondary Diagnosis:	Fetal distress-delivered   1

## 2022-12-20 NOTE — OB RN DELIVERY SUMMARY - NSSELHIDDEN_OBGYN_ALL_OB_FT
[NS_DeliveryAttending1_OBGYN_ALL_OB_FT:[id number]=],[NS_DeliveryRN_OBGYN_ALL_OB_FT:[id number]]

## 2022-12-20 NOTE — DISCHARGE NOTE OB - HOSPITAL COURSE
Patient admitted for induction for gestational hypertension. Patient progresed to 4 cm and then had bradycardia for 8 minutes o had emergency c/section

## 2022-12-20 NOTE — DISCHARGE NOTE OB - MATERIALS PROVIDED
Vaccinations/[facility]  Screening Program/  Immunization Record/Breastfeeding Log/Breastfeeding Mother’s Support Group Information/Guide to Postpartum Care/[facility] Hearing Screen Program/Back To Sleep Handout/Shaken Baby Prevention Handout/Breastfeeding Guide and Packet/Birth Certificate Instructions/Tdap Vaccination (VIS Pub Date: 2012)

## 2022-12-21 ENCOUNTER — APPOINTMENT (OUTPATIENT)
Dept: ANTEPARTUM | Facility: CLINIC | Age: 37
End: 2022-12-21

## 2022-12-21 PROBLEM — Z78.9 OTHER SPECIFIED HEALTH STATUS: Chronic | Status: ACTIVE | Noted: 2022-12-19

## 2022-12-21 LAB
ALBUMIN SERPL ELPH-MCNC: 3.2 G/DL — LOW (ref 3.3–5)
ALP SERPL-CCNC: 234 U/L — HIGH (ref 40–120)
ALT FLD-CCNC: 18 U/L — SIGNIFICANT CHANGE UP (ref 4–33)
ANION GAP SERPL CALC-SCNC: 11 MMOL/L — SIGNIFICANT CHANGE UP (ref 7–14)
APTT BLD: 28.6 SEC — SIGNIFICANT CHANGE UP (ref 27–36.3)
AST SERPL-CCNC: 51 U/L — HIGH (ref 4–32)
BASOPHILS # BLD AUTO: 0.03 K/UL — SIGNIFICANT CHANGE UP (ref 0–0.2)
BASOPHILS NFR BLD AUTO: 0.2 % — SIGNIFICANT CHANGE UP (ref 0–2)
BILIRUB SERPL-MCNC: 0.4 MG/DL — SIGNIFICANT CHANGE UP (ref 0.2–1.2)
BUN SERPL-MCNC: 5 MG/DL — LOW (ref 7–23)
CALCIUM SERPL-MCNC: 7.7 MG/DL — LOW (ref 8.4–10.5)
CHLORIDE SERPL-SCNC: 101 MMOL/L — SIGNIFICANT CHANGE UP (ref 98–107)
CO2 SERPL-SCNC: 22 MMOL/L — SIGNIFICANT CHANGE UP (ref 22–31)
CREAT SERPL-MCNC: 0.44 MG/DL — LOW (ref 0.5–1.3)
EGFR: 129 ML/MIN/1.73M2 — SIGNIFICANT CHANGE UP
EOSINOPHIL # BLD AUTO: 0.06 K/UL — SIGNIFICANT CHANGE UP (ref 0–0.5)
EOSINOPHIL NFR BLD AUTO: 0.3 % — SIGNIFICANT CHANGE UP (ref 0–6)
FIBRINOGEN PPP-MCNC: 419 MG/DL — SIGNIFICANT CHANGE UP (ref 200–465)
GLUCOSE SERPL-MCNC: 104 MG/DL — HIGH (ref 70–99)
HCT VFR BLD CALC: 30 % — LOW (ref 34.5–45)
HGB BLD-MCNC: 9.6 G/DL — LOW (ref 11.5–15.5)
IANC: 15.53 K/UL — HIGH (ref 1.8–7.4)
IMM GRANULOCYTES NFR BLD AUTO: 0.5 % — SIGNIFICANT CHANGE UP (ref 0–0.9)
INR BLD: 0.98 RATIO — SIGNIFICANT CHANGE UP (ref 0.88–1.16)
LDH SERPL L TO P-CCNC: 391 U/L — HIGH (ref 135–225)
LYMPHOCYTES # BLD AUTO: 11.9 % — LOW (ref 13–44)
LYMPHOCYTES # BLD AUTO: 2.28 K/UL — SIGNIFICANT CHANGE UP (ref 1–3.3)
MAGNESIUM SERPL-MCNC: 5.9 MG/DL — HIGH (ref 1.6–2.6)
MCHC RBC-ENTMCNC: 23.5 PG — LOW (ref 27–34)
MCHC RBC-ENTMCNC: 32 GM/DL — SIGNIFICANT CHANGE UP (ref 32–36)
MCV RBC AUTO: 73.3 FL — LOW (ref 80–100)
MONOCYTES # BLD AUTO: 1.22 K/UL — HIGH (ref 0–0.9)
MONOCYTES NFR BLD AUTO: 6.3 % — SIGNIFICANT CHANGE UP (ref 2–14)
NEUTROPHILS # BLD AUTO: 15.53 K/UL — HIGH (ref 1.8–7.4)
NEUTROPHILS NFR BLD AUTO: 80.8 % — HIGH (ref 43–77)
NRBC # BLD: 0 /100 WBCS — SIGNIFICANT CHANGE UP (ref 0–0)
NRBC # FLD: 0 K/UL — SIGNIFICANT CHANGE UP (ref 0–0)
PLATELET # BLD AUTO: 305 K/UL — SIGNIFICANT CHANGE UP (ref 150–400)
POTASSIUM SERPL-MCNC: 3.8 MMOL/L — SIGNIFICANT CHANGE UP (ref 3.5–5.3)
POTASSIUM SERPL-SCNC: 3.8 MMOL/L — SIGNIFICANT CHANGE UP (ref 3.5–5.3)
PROT SERPL-MCNC: 6.3 G/DL — SIGNIFICANT CHANGE UP (ref 6–8.3)
PROTHROM AB SERPL-ACNC: 11.4 SEC — SIGNIFICANT CHANGE UP (ref 10.5–13.4)
RBC # BLD: 4.09 M/UL — SIGNIFICANT CHANGE UP (ref 3.8–5.2)
RBC # FLD: 17.7 % — HIGH (ref 10.3–14.5)
SODIUM SERPL-SCNC: 134 MMOL/L — LOW (ref 135–145)
URATE SERPL-MCNC: 4.1 MG/DL — SIGNIFICANT CHANGE UP (ref 2.5–7)
WBC # BLD: 19.22 K/UL — HIGH (ref 3.8–10.5)
WBC # FLD AUTO: 19.22 K/UL — HIGH (ref 3.8–10.5)

## 2022-12-21 RX ORDER — IBUPROFEN 200 MG
600 TABLET ORAL EVERY 6 HOURS
Refills: 0 | Status: DISCONTINUED | OUTPATIENT
Start: 2022-12-21 | End: 2022-12-23

## 2022-12-21 RX ORDER — OXYCODONE HYDROCHLORIDE 5 MG/1
5 TABLET ORAL
Refills: 0 | Status: DISCONTINUED | OUTPATIENT
Start: 2022-12-21 | End: 2022-12-23

## 2022-12-21 RX ADMIN — Medication 30 MILLIGRAM(S): at 05:46

## 2022-12-21 RX ADMIN — Medication 30 MILLIGRAM(S): at 06:16

## 2022-12-21 RX ADMIN — Medication 975 MILLIGRAM(S): at 21:27

## 2022-12-21 RX ADMIN — Medication 600 MILLIGRAM(S): at 23:35

## 2022-12-21 RX ADMIN — Medication 600 MILLIGRAM(S): at 12:45

## 2022-12-21 RX ADMIN — ERTAPENEM SODIUM 120 MILLIGRAM(S): 1 INJECTION, POWDER, LYOPHILIZED, FOR SOLUTION INTRAMUSCULAR; INTRAVENOUS at 08:39

## 2022-12-21 RX ADMIN — Medication 30 MILLIGRAM(S): at 10:43

## 2022-12-21 RX ADMIN — Medication 30 MILLIGRAM(S): at 00:50

## 2022-12-21 RX ADMIN — Medication 600 MILLIGRAM(S): at 18:58

## 2022-12-21 RX ADMIN — Medication 975 MILLIGRAM(S): at 09:30

## 2022-12-21 RX ADMIN — Medication 30 MILLIGRAM(S): at 00:19

## 2022-12-21 RX ADMIN — Medication 50 GM/HR: at 05:43

## 2022-12-21 RX ADMIN — Medication 975 MILLIGRAM(S): at 15:20

## 2022-12-21 RX ADMIN — Medication 600 MILLIGRAM(S): at 18:24

## 2022-12-21 RX ADMIN — HEPARIN SODIUM 5000 UNIT(S): 5000 INJECTION INTRAVENOUS; SUBCUTANEOUS at 14:26

## 2022-12-21 RX ADMIN — Medication 975 MILLIGRAM(S): at 08:39

## 2022-12-21 RX ADMIN — Medication 50 GM/HR: at 07:06

## 2022-12-21 RX ADMIN — Medication 975 MILLIGRAM(S): at 14:25

## 2022-12-21 RX ADMIN — Medication 600 MILLIGRAM(S): at 13:45

## 2022-12-21 RX ADMIN — HEPARIN SODIUM 5000 UNIT(S): 5000 INJECTION INTRAVENOUS; SUBCUTANEOUS at 03:15

## 2022-12-21 RX ADMIN — Medication 975 MILLIGRAM(S): at 20:54

## 2022-12-21 RX ADMIN — Medication 975 MILLIGRAM(S): at 03:15

## 2022-12-21 RX ADMIN — Medication 975 MILLIGRAM(S): at 03:45

## 2022-12-21 NOTE — PROGRESS NOTE ADULT - SUBJECTIVE AND OBJECTIVE BOX
OB Progress Note:  Delivery, POD#1    S: 37y POD#1 s/p pLTCS for NRFHT under GETA c/b PEC w/ SF (BPs) on Mg. Pain controlled. Tolerating a regular diet and passing flatus. Denies n/v, chest pain, shortness of breath, or lightheadedness/dizziness. Has been out of bed and ambulated. Frazier is in place. Denies any fevers, chills, headache, or scotomas     O:   Vital Signs Last 24 Hrs  T(C): 36.2 (21 Dec 2022 06:20), Max: 36.8 (20 Dec 2022 16:12)  T(F): 97.2 (21 Dec 2022 06:20), Max: 98.2 (20 Dec 2022 16:12)  HR: 83 (21 Dec 2022 06:20) (78 - 120)  BP: 128/74 (21 Dec 2022 06:20) (108/54 - 177/92)  BP(mean): 86 (21 Dec 2022 06:20) (66 - 108)  RR: 19 (21 Dec 2022 06:20) (13 - 21)  SpO2: 98% (21 Dec 2022 06:20) (93% - 100%)    Parameters below as of 21 Dec 2022 06:20  Patient On (Oxygen Delivery Method): room air        Labs:  Blood type: O Positive  Rubella IgG: RPR: Negative                          9.6<L>   19.22<H> >-----------< 305    (  @ 05:00 )             30.0<L>                        10.4<L>   17.85<H> >-----------< 302    (  @ 09:53 )             34.2<L>                        11.0<L>   8.35 >-----------< 309    (  @ 19:00 )             35.4    22 @ 05:00      134<L>  |  101  |  5<L>  ----------------------------<  104<H>  3.8   |  22  |  0.44<L>    22 @ 09:53      134<L>  |  103  |  7   ----------------------------<  100<H>  4.3   |  21<L>  |  0.50    22 @ 19:00      135  |  103  |  8   ----------------------------<  86  4.0   |  24  |  0.47<L>        Ca    7.7<L>      21 Dec 2022 05:00  Ca    9.1      20 Dec 2022 09:53  Ca    9.3      19 Dec 2022 19:00  Mg     5.90<H>     -  Mg     4.70<H>     12-20  Mg     4.60<H>     -20    TPro  6.3  /  Alb  3.2<L>  /  TBili  0.4  /  DBili  x   /  AST  51<H>  /  ALT  18  /  AlkPhos  234<H>  22 @ 05:00  TPro  6.2  /  Alb  3.1<L>  /  TBili  0.2  /  DBili  x   /  AST  26  /  ALT  14  /  AlkPhos  254<H>  22 @ 09:53  TPro  7.1  /  Alb  3.6  /  TBili  0.4  /  DBili  x   /  AST  21  /  ALT  10  /  AlkPhos  283<H>  22 @ 19:00          PE:  General: No acute distress. Resting in bed prior to eval   Pulm: Unlabored breathing. No respiratory distress  Abdomen: Soft. Non-tender. Incision c/d/i   Extremities: No calf tenderness bilaterally. SCDs in place b/l      OB Progress Note:  Delivery, POD#1    S: 37y POD#1 s/p pLTCS for NRFHT under GETA c/b PEC w/ SF (BPs) on Mg. Pain controlled. Tolerating a regular diet and passing flatus. Denies n/v, chest pain, shortness of breath, or lightheadedness/dizziness. Has been out of bed and ambulated. Frazier is in place. Denies any fevers, chills, headache, or scotomas     O:   Vital Signs Last 24 Hrs  T(C): 36.2 (21 Dec 2022 06:20), Max: 36.8 (20 Dec 2022 16:12)  T(F): 97.2 (21 Dec 2022 06:20), Max: 98.2 (20 Dec 2022 16:12)  HR: 83 (21 Dec 2022 06:20) (78 - 120)  BP: 128/74 (21 Dec 2022 06:20) (108/54 - 177/92)  BP(mean): 86 (21 Dec 2022 06:20) (66 - 108)  RR: 19 (21 Dec 2022 06:20) (13 - 21)  SpO2: 98% (21 Dec 2022 06:20) (93% - 100%)    Parameters below as of 21 Dec 2022 06:20  Patient On (Oxygen Delivery Method): room air        Labs:  Blood type: O Positive  Rubella IgG: RPR: Negative                          9.6<L>   19.22<H> >-----------< 305    (  @ 05:00 )             30.0<L>                        10.4<L>   17.85<H> >-----------< 302    (  @ 09:53 )             34.2<L>                        11.0<L>   8.35 >-----------< 309    (  @ 19:00 )             35.4    22 @ 05:00      134<L>  |  101  |  5<L>  ----------------------------<  104<H>  3.8   |  22  |  0.44<L>    22 @ 09:53      134<L>  |  103  |  7   ----------------------------<  100<H>  4.3   |  21<L>  |  0.50    22 @ 19:00      135  |  103  |  8   ----------------------------<  86  4.0   |  24  |  0.47<L>        Ca    7.7<L>      21 Dec 2022 05:00  Ca    9.1      20 Dec 2022 09:53  Ca    9.3      19 Dec 2022 19:00  Mg     5.90<H>     -  Mg     4.70<H>     12-20  Mg     4.60<H>     -20    TPro  6.3  /  Alb  3.2<L>  /  TBili  0.4  /  DBili  x   /  AST  51<H>  /  ALT  18  /  AlkPhos  234<H>  22 @ 05:00  TPro  6.2  /  Alb  3.1<L>  /  TBili  0.2  /  DBili  x   /  AST  26  /  ALT  14  /  AlkPhos  254<H>  22 @ 09:53  TPro  7.1  /  Alb  3.6  /  TBili  0.4  /  DBili  x   /  AST  21  /  ALT  10  /  AlkPhos  283<H>  22 @ 19:00          PE:  General: No acute distress. Resting in bed prior to eval   Pulm: Unlabored breathing. No respiratory distress  Abdomen: Soft. Non-tender. Incision c/d/i   : Frazier is in place   Extremities: No calf tenderness bilaterally. SCDs in place b/l

## 2022-12-21 NOTE — PROGRESS NOTE ADULT - ASSESSMENT
A/P: 37y POD#1 s/p pLTCS for NRFHT under GETA c/b PEC w/ SF (BPs) on Mg. Patient is progressing appropriately post-operatively   - Continue regular diet.  - Encourage ambulation as tolerated   - Continue motrin, tylenol, oxycodone PRN for pain control.    #PEC w/ SF  - On Nifedipine 30mg XL qd   - BPs 120-130s/60-70s  - Asx  - HELLP labs this AM reviewed. AST/ALT noted to be 51/18, but not twice the upper limit of normal    Nikolay Barnes, PGY-1  Ob/Gyn A/P: 37y POD#1 s/p pLTCS for NRFHT under GETA c/b PEC w/ SF (BPs) on Mg. Patient is progressing appropriately post-operatively   - Continue regular diet.  - Encourage ambulation as tolerated   - Continue motrin, tylenol, oxycodone PRN for pain control.    #PEC w/ SF  - For 24hrs of Mg postpartum  - On Nifedipine 30mg XL qd   - BPs 120-130s/60-70s  - Asx  - HELLP labs this AM reviewed. AST/ALT noted to be 51/18, but not twice the upper limit of normal. Can consider repeat HELLP labs tomorrow (12/22)    Nikolay Barnes, PGY-1  Ob/Gyn

## 2022-12-21 NOTE — PROGRESS NOTE ADULT - ATTENDING COMMENTS
Pt seen and evaluated at bedside  Agree with above   POD1 s/p pLTCS for terminal ragini c/b sPEC now s/p Mag  Pt doing well, pain controlled  Bps controlled on Procardia 30XL, continue to monitor    stephanie FERNANDEZ

## 2022-12-21 NOTE — PROGRESS NOTE ADULT - SUBJECTIVE AND OBJECTIVE BOX
POST OP DAY  1  s/p   SECTION    SUBJECTIVE:  I'm ok.    PAIN SCALE SCORE: [x] Refer to charted pain scores    THERAPY:  [  ] Spinal morphine   [  ] Epidural morphine   [ x ] IV PCA Hydromorphone 1 mg/ml    OBJECTIVE:  Comfortable Appearing    SEDATION SCORE:	  [ x ] Alert	    [  ] Drowsy        [  ] Arousable	[  ] Asleep	[  ] Unresponsive    Side Effects:	  [ x ] None	     [  ] Nausea        [  ] Pruritus        [  ] Weakness   [  ] Numbness        ASSESSMENT/ PLAN   [   ] Discontinue         [  ] Continue    [ x ] Change to prn Analgesics as per primary service.    DOCUMENTATION & VERIFICATION OF CURRENT MEDS [ x ] Done    COMMENTS: No Headache.

## 2022-12-22 RX ADMIN — Medication 600 MILLIGRAM(S): at 17:15

## 2022-12-22 RX ADMIN — Medication 975 MILLIGRAM(S): at 02:25

## 2022-12-22 RX ADMIN — Medication 600 MILLIGRAM(S): at 16:18

## 2022-12-22 RX ADMIN — Medication 600 MILLIGRAM(S): at 06:13

## 2022-12-22 RX ADMIN — Medication 600 MILLIGRAM(S): at 12:30

## 2022-12-22 RX ADMIN — Medication 975 MILLIGRAM(S): at 03:47

## 2022-12-22 RX ADMIN — Medication 600 MILLIGRAM(S): at 00:05

## 2022-12-22 RX ADMIN — Medication 975 MILLIGRAM(S): at 10:00

## 2022-12-22 RX ADMIN — Medication 975 MILLIGRAM(S): at 17:57

## 2022-12-22 RX ADMIN — Medication 975 MILLIGRAM(S): at 04:17

## 2022-12-22 RX ADMIN — Medication 975 MILLIGRAM(S): at 09:04

## 2022-12-22 RX ADMIN — Medication 600 MILLIGRAM(S): at 11:34

## 2022-12-22 RX ADMIN — HEPARIN SODIUM 5000 UNIT(S): 5000 INJECTION INTRAVENOUS; SUBCUTANEOUS at 16:18

## 2022-12-22 RX ADMIN — HEPARIN SODIUM 5000 UNIT(S): 5000 INJECTION INTRAVENOUS; SUBCUTANEOUS at 03:47

## 2022-12-22 RX ADMIN — Medication 975 MILLIGRAM(S): at 22:25

## 2022-12-22 RX ADMIN — Medication 600 MILLIGRAM(S): at 05:43

## 2022-12-22 RX ADMIN — Medication 30 MILLIGRAM(S): at 11:33

## 2022-12-22 NOTE — PROGRESS NOTE ADULT - SUBJECTIVE AND OBJECTIVE BOX
OB Progress Note: LTCS, POD#2    S: 37y POD#2 s/p pLTCS for NRFHT under GETA c/b PEC w/ SF (BPs) on Mg. Pain is controlled. She is tolerating a regular diet and passing flatus. She is voiding spontaneously, and ambulating without difficulty. Denies CP/SOB. Denies lightheadedness/dizziness. Denies N/V. Denies any headache or scotomas     O:  Vitals:  Vital Signs Last 24 Hrs  T(C): 37 (22 Dec 2022 06:00), Max: 37.2 (21 Dec 2022 22:34)  T(F): 98.6 (22 Dec 2022 06:00), Max: 98.9 (21 Dec 2022 22:34)  HR: 79 (22 Dec 2022 06:00) (68 - 91)  BP: 117/79 (22 Dec 2022 06:00) (117/79 - 140/88)  BP(mean): --  RR: 18 (22 Dec 2022 06:00) (18 - 18)  SpO2: 100% (22 Dec 2022 06:00) (99% - 100%)    Parameters below as of 22 Dec 2022 06:00  Patient On (Oxygen Delivery Method): room air        MEDICATIONS  (STANDING):  acetaminophen     Tablet .. 975 milliGRAM(s) Oral <User Schedule>  diphtheria/tetanus/pertussis (acellular) Vaccine (Adacel) 0.5 milliLiter(s) IntraMuscular once  heparin   Injectable 5000 Unit(s) SubCutaneous every 12 hours  ibuprofen  Tablet. 600 milliGRAM(s) Oral every 6 hours  lactated ringers Bolus 500 milliLiter(s) IV Bolus once  lactated ringers Bolus 500 milliLiter(s) IV Bolus once  NIFEdipine XL 30 milliGRAM(s) Oral daily      MEDICATIONS  (PRN):  diphenhydrAMINE 25 milliGRAM(s) Oral every 6 hours PRN Pruritus  lanolin Ointment 1 Application(s) Topical every 6 hours PRN Sore Nipples  magnesium hydroxide Suspension 30 milliLiter(s) Oral two times a day PRN Constipation  oxyCODONE    IR 5 milliGRAM(s) Oral once PRN Moderate to Severe Pain (4-10)  oxyCODONE    IR 5 milliGRAM(s) Oral every 3 hours PRN Moderate to Severe Pain (4-10)  simethicone 80 milliGRAM(s) Chew every 4 hours PRN Gas      Labs:  Blood type: O Positive  Rubella IgG: RPR: Negative                          9.6<L>   19.22<H> >-----------< 305    ( 12-21 @ 05:00 )             30.0<L>                        10.4<L>   17.85<H> >-----------< 302    ( 12-20 @ 09:53 )             34.2<L>                        11.0<L>   8.35 >-----------< 309    ( 12-19 @ 19:00 )             35.4    12-21-22 @ 05:00      134<L>  |  101  |  5<L>  ----------------------------<  104<H>  3.8   |  22  |  0.44<L>    12-20-22 @ 09:53      134<L>  |  103  |  7   ----------------------------<  100<H>  4.3   |  21<L>  |  0.50    12-19-22 @ 19:00      135  |  103  |  8   ----------------------------<  86  4.0   |  24  |  0.47<L>        Ca    7.7<L>      21 Dec 2022 05:00  Ca    9.1      20 Dec 2022 09:53  Ca    9.3      19 Dec 2022 19:00  Mg     5.90<H>     12-21  Mg     4.70<H>     12-20  Mg     4.60<H>     12-20    TPro  6.3  /  Alb  3.2<L>  /  TBili  0.4  /  DBili  x   /  AST  51<H>  /  ALT  18  /  AlkPhos  234<H>  12-21-22 @ 05:00  TPro  6.2  /  Alb  3.1<L>  /  TBili  0.2  /  DBili  x   /  AST  26  /  ALT  14  /  AlkPhos  254<H>  12-20-22 @ 09:53  TPro  7.1  /  Alb  3.6  /  TBili  0.4  /  DBili  x   /  AST  21  /  ALT  10  /  AlkPhos  283<H>  12-19-22 @ 19:00          PE:  General: NAD. Sitting up in bed comfortably prior to eval   Pulm: Unlabored breathing. No respiratory distress  Abdomen: Incision c/d/i. Non-tender. Soft abdomen   Extremities: No calf tenderness bilaterally.

## 2022-12-22 NOTE — PROGRESS NOTE ADULT - ASSESSMENT
A/P: 37y POD#2 s/p pLTCS for NRFHT under GETA c/b PEC w/ SF (BPs) on Mg. Patient is progressing appropriately post-operatively   - Continue regular diet.  - Encourage ambulation  - Continue motrin, tylenol, oxycodone PRN for pain control    #PEC w/ SF  - s/p 24hrs of Mg postpartum  - On Nifedipine 30mg XL qd   - BPs this -140/70-80s  - Pt asx    Nikolay Barnes, PGY-1  Obstetrics and Gynecology

## 2022-12-23 ENCOUNTER — APPOINTMENT (OUTPATIENT)
Dept: OBGYN | Facility: CLINIC | Age: 37
End: 2022-12-23

## 2022-12-23 VITALS
RESPIRATION RATE: 18 BRPM | DIASTOLIC BLOOD PRESSURE: 85 MMHG | TEMPERATURE: 85 F | HEART RATE: 85 BPM | SYSTOLIC BLOOD PRESSURE: 135 MMHG | OXYGEN SATURATION: 99 %

## 2022-12-23 RX ORDER — NIFEDIPINE 30 MG/1
30 TABLET, EXTENDED RELEASE ORAL DAILY
Qty: 30 | Refills: 0 | Status: ACTIVE | COMMUNITY
Start: 2022-12-23 | End: 1900-01-01

## 2022-12-23 RX ORDER — SENNA PLUS 8.6 MG/1
1 TABLET ORAL
Refills: 0 | Status: DISCONTINUED | OUTPATIENT
Start: 2022-12-23 | End: 2022-12-23

## 2022-12-23 RX ORDER — FERROUS SULFATE 325(65) MG
325 TABLET ORAL
Refills: 0 | Status: DISCONTINUED | OUTPATIENT
Start: 2022-12-23 | End: 2022-12-23

## 2022-12-23 RX ORDER — ASCORBIC ACID 60 MG
500 TABLET,CHEWABLE ORAL DAILY
Refills: 0 | Status: DISCONTINUED | OUTPATIENT
Start: 2022-12-23 | End: 2022-12-23

## 2022-12-23 RX ADMIN — SENNA PLUS 1 TABLET(S): 8.6 TABLET ORAL at 06:30

## 2022-12-23 RX ADMIN — Medication 600 MILLIGRAM(S): at 07:01

## 2022-12-23 RX ADMIN — Medication 975 MILLIGRAM(S): at 05:06

## 2022-12-23 RX ADMIN — Medication 975 MILLIGRAM(S): at 12:00

## 2022-12-23 RX ADMIN — Medication 325 MILLIGRAM(S): at 06:31

## 2022-12-23 RX ADMIN — Medication 30 MILLIGRAM(S): at 11:16

## 2022-12-23 RX ADMIN — Medication 975 MILLIGRAM(S): at 11:16

## 2022-12-23 RX ADMIN — Medication 600 MILLIGRAM(S): at 06:31

## 2022-12-23 RX ADMIN — HEPARIN SODIUM 5000 UNIT(S): 5000 INJECTION INTRAVENOUS; SUBCUTANEOUS at 05:06

## 2022-12-23 RX ADMIN — Medication 600 MILLIGRAM(S): at 01:44

## 2022-12-23 RX ADMIN — Medication 975 MILLIGRAM(S): at 05:46

## 2022-12-23 RX ADMIN — Medication 600 MILLIGRAM(S): at 01:14

## 2022-12-23 NOTE — PROGRESS NOTE ADULT - ASSESSMENT
A/P: 37y POD#3 s/p pLTCS for NRFHT under GETA c/b PEC w/ SF (BPs) on Mg. Patient is progressing appropriately post-operatively     #Postpartum  - Continue regular diet.  - Encourage ambulation  - Continue motrin, tylenol, oxycodone PRN for pain control    #PEC w/ SF  - s/p 24hrs of Mg postpartum  - On Nifedipine 30mg XL qd   - BPs overnight: 120s-140s/70-80s  - Denies symptoms of PEC at this time    IVANA Ahuja PGY1

## 2022-12-23 NOTE — PROGRESS NOTE ADULT - SUBJECTIVE AND OBJECTIVE BOX
R1 Progress Note    Patient seen and examined at bedside, no acute overnight events. No acute complaints, pain well controlled. Patient is ambulating and tolerating regular diet. Passing flatus, voiding spontaneously. Denies CP, SOB, N/V, HA, blurred vision, epigastric pain.    Vital Signs Last 24 Hours  T(C): 36.6 (12-23-22 @ 06:00), Max: 37 (12-22-22 @ 11:25)  HR: 88 (12-23-22 @ 06:00) (80 - 91)  BP: 131/79 (12-23-22 @ 06:00) (121/67 - 140/81)  RR: 18 (12-23-22 @ 06:00) (18 - 20)  SpO2: 100% (12-23-22 @ 06:00) (98% - 100%)    I&O's Summary      Physical Exam:  General: NAD  Abdomen: Soft, non-tender, non-distended, fundus firm  Incision: Pfannenstiel incision CDI  Pelvic: Lochia wnl    Labs:    Blood Type: O Positive  Antibody Screen: Negative  RPR: Negative               9.6    19.22 )-----------( 305      ( 12-21 @ 05:00 )             30.0                10.4   17.85 )-----------( 302      ( 12-20 @ 09:53 )             34.2                11.0   8.35  )-----------( 309      ( 12-19 @ 19:00 )             35.4         MEDICATIONS  (STANDING):  acetaminophen     Tablet .. 975 milliGRAM(s) Oral <User Schedule>  ascorbic acid 500 milliGRAM(s) Oral daily  diphtheria/tetanus/pertussis (acellular) Vaccine (Adacel) 0.5 milliLiter(s) IntraMuscular once  ferrous    sulfate 325 milliGRAM(s) Oral two times a day  heparin   Injectable 5000 Unit(s) SubCutaneous every 12 hours  ibuprofen  Tablet. 600 milliGRAM(s) Oral every 6 hours  NIFEdipine XL 30 milliGRAM(s) Oral daily  senna 1 Tablet(s) Oral two times a day    MEDICATIONS  (PRN):  diphenhydrAMINE 25 milliGRAM(s) Oral every 6 hours PRN Pruritus  lanolin Ointment 1 Application(s) Topical every 6 hours PRN Sore Nipples  magnesium hydroxide Suspension 30 milliLiter(s) Oral two times a day PRN Constipation  oxyCODONE    IR 5 milliGRAM(s) Oral once PRN Moderate to Severe Pain (4-10)  oxyCODONE    IR 5 milliGRAM(s) Oral every 3 hours PRN Moderate to Severe Pain (4-10)  simethicone 80 milliGRAM(s) Chew every 4 hours PRN Gas

## 2022-12-23 NOTE — PROGRESS NOTE ADULT - SUBJECTIVE AND OBJECTIVE BOX
Postpartum Note,  Section  She is a  37y woman who is now post-operative day 3, BP controlled with Procardia    Subjective:  The patient feels well.  She is ambulating.   She is tolerating regular diet.  She denies nausea and vomiting.  She is voiding.  Her pain is controlled.  She reports normal postpartum bleeding.  She is breastfeeding.      Physical exam:    Vital Signs Last 24 Hrs  T(C): 36.8 (23 Dec 2022 10:27), Max: 37 (22 Dec 2022 11:25)  T(F): 98.3 (23 Dec 2022 10:27), Max: 98.6 (22 Dec 2022 11:25)  HR: 90 (23 Dec 2022 10:27) (80 - 91)  BP: 135/85 (23 Dec 2022 10:27) (121/67 - 140/81)  BP(mean): --  RR: 16 (23 Dec 2022 10:27) (16 - 20)  SpO2: 100% (23 Dec 2022 10:27) (98% - 100%)    Parameters below as of 23 Dec 2022 10:27  Patient On (Oxygen Delivery Method): room air        Gen: NAD  Breast: Soft, nontender, not engorged.  Abdomen: Soft, nontender, no distension , firm uterine fundus at umbilicus.  Incision: Clean, dry, and intact with steri strips  Pelvic: Normal lochia noted  Ext: No calf tenderness    LABS:      Rubella status:     Allergies    No Known Allergies    Intolerances      MEDICATIONS  (STANDING):  acetaminophen     Tablet .. 975 milliGRAM(s) Oral <User Schedule>  ascorbic acid 500 milliGRAM(s) Oral daily  diphtheria/tetanus/pertussis (acellular) Vaccine (Adacel) 0.5 milliLiter(s) IntraMuscular once  ferrous    sulfate 325 milliGRAM(s) Oral two times a day  heparin   Injectable 5000 Unit(s) SubCutaneous every 12 hours  ibuprofen  Tablet. 600 milliGRAM(s) Oral every 6 hours  NIFEdipine XL 30 milliGRAM(s) Oral daily  senna 1 Tablet(s) Oral two times a day    MEDICATIONS  (PRN):  diphenhydrAMINE 25 milliGRAM(s) Oral every 6 hours PRN Pruritus  lanolin Ointment 1 Application(s) Topical every 6 hours PRN Sore Nipples  magnesium hydroxide Suspension 30 milliLiter(s) Oral two times a day PRN Constipation  oxyCODONE    IR 5 milliGRAM(s) Oral once PRN Moderate to Severe Pain (4-10)  oxyCODONE    IR 5 milliGRAM(s) Oral every 3 hours PRN Moderate to Severe Pain (4-10)  simethicone 80 milliGRAM(s) Chew every 4 hours PRN Gas        Assessment and Plan  POD # 3 s/p emergency  section, Preeclampsia without severe features   Doing well.  Discharge planning

## 2023-02-01 ENCOUNTER — APPOINTMENT (OUTPATIENT)
Dept: OBGYN | Facility: CLINIC | Age: 38
End: 2023-02-01

## 2023-08-10 NOTE — OB RN DELIVERY SUMMARY - NS_BABYDISPOA_OBGYN_ALL_OB
Mother's Bedside/Non- Isotretinoin Pregnancy And Lactation Text: This medication is Pregnancy Category X and is considered extremely dangerous during pregnancy. It is unknown if it is excreted in breast milk.

## 2025-06-23 ENCOUNTER — EMERGENCY (EMERGENCY)
Facility: HOSPITAL | Age: 40
LOS: 0 days | Discharge: HOME | End: 2025-06-23
Attending: EMERGENCY MEDICINE
Payer: COMMERCIAL

## 2025-06-23 VITALS
HEART RATE: 87 BPM | TEMPERATURE: 98 F | SYSTOLIC BLOOD PRESSURE: 149 MMHG | WEIGHT: 157.41 LBS | RESPIRATION RATE: 18 BRPM | OXYGEN SATURATION: 100 % | HEIGHT: 61 IN | DIASTOLIC BLOOD PRESSURE: 104 MMHG

## 2025-06-23 VITALS
DIASTOLIC BLOOD PRESSURE: 86 MMHG | OXYGEN SATURATION: 100 % | SYSTOLIC BLOOD PRESSURE: 112 MMHG | HEART RATE: 66 BPM | RESPIRATION RATE: 15 BRPM

## 2025-06-23 DIAGNOSIS — R26.81 UNSTEADINESS ON FEET: ICD-10-CM

## 2025-06-23 DIAGNOSIS — R42 DIZZINESS AND GIDDINESS: ICD-10-CM

## 2025-06-23 DIAGNOSIS — R20.2 PARESTHESIA OF SKIN: ICD-10-CM

## 2025-06-23 DIAGNOSIS — R20.0 ANESTHESIA OF SKIN: ICD-10-CM

## 2025-06-23 DIAGNOSIS — R20.3 HYPERESTHESIA: ICD-10-CM

## 2025-06-23 LAB
ALBUMIN SERPL ELPH-MCNC: 4.1 G/DL — SIGNIFICANT CHANGE UP (ref 3.3–5)
ALP SERPL-CCNC: 73 U/L — SIGNIFICANT CHANGE UP (ref 40–120)
ALT FLD-CCNC: 27 U/L — SIGNIFICANT CHANGE UP (ref 12–78)
ANION GAP SERPL CALC-SCNC: 8 MMOL/L — SIGNIFICANT CHANGE UP (ref 5–17)
APTT BLD: 32.6 SEC — SIGNIFICANT CHANGE UP (ref 26.1–36.8)
AST SERPL-CCNC: 23 U/L — SIGNIFICANT CHANGE UP (ref 15–37)
BASOPHILS # BLD AUTO: 0.05 K/UL — SIGNIFICANT CHANGE UP (ref 0–0.2)
BASOPHILS NFR BLD AUTO: 0.6 % — SIGNIFICANT CHANGE UP (ref 0–2)
BILIRUB SERPL-MCNC: 0.7 MG/DL — SIGNIFICANT CHANGE UP (ref 0.2–1.2)
BUN SERPL-MCNC: 12 MG/DL — SIGNIFICANT CHANGE UP (ref 7–23)
CALCIUM SERPL-MCNC: 9.5 MG/DL — SIGNIFICANT CHANGE UP (ref 8.5–10.1)
CHLORIDE SERPL-SCNC: 108 MMOL/L — SIGNIFICANT CHANGE UP (ref 96–108)
CO2 SERPL-SCNC: 25 MMOL/L — SIGNIFICANT CHANGE UP (ref 22–31)
CREAT SERPL-MCNC: 0.75 MG/DL — SIGNIFICANT CHANGE UP (ref 0.5–1.3)
EGFR: 104 ML/MIN/1.73M2 — SIGNIFICANT CHANGE UP
EGFR: 104 ML/MIN/1.73M2 — SIGNIFICANT CHANGE UP
EOSINOPHIL # BLD AUTO: 0.05 K/UL — SIGNIFICANT CHANGE UP (ref 0–0.5)
EOSINOPHIL NFR BLD AUTO: 0.6 % — SIGNIFICANT CHANGE UP (ref 0–6)
GLUCOSE SERPL-MCNC: 98 MG/DL — SIGNIFICANT CHANGE UP (ref 70–99)
HCT VFR BLD CALC: 37 % — SIGNIFICANT CHANGE UP (ref 34.5–45)
HGB BLD-MCNC: 12.3 G/DL — SIGNIFICANT CHANGE UP (ref 11.5–15.5)
IMM GRANULOCYTES NFR BLD AUTO: 0.2 % — SIGNIFICANT CHANGE UP (ref 0–0.9)
INR BLD: 0.97 RATIO — SIGNIFICANT CHANGE UP (ref 0.85–1.16)
LYMPHOCYTES # BLD AUTO: 2.32 K/UL — SIGNIFICANT CHANGE UP (ref 1–3.3)
LYMPHOCYTES # BLD AUTO: 26.4 % — SIGNIFICANT CHANGE UP (ref 13–44)
MCHC RBC-ENTMCNC: 26.3 PG — LOW (ref 27–34)
MCHC RBC-ENTMCNC: 33.2 G/DL — SIGNIFICANT CHANGE UP (ref 32–36)
MCV RBC AUTO: 79.1 FL — LOW (ref 80–100)
MONOCYTES # BLD AUTO: 0.66 K/UL — SIGNIFICANT CHANGE UP (ref 0–0.9)
MONOCYTES NFR BLD AUTO: 7.5 % — SIGNIFICANT CHANGE UP (ref 2–14)
NEUTROPHILS # BLD AUTO: 5.7 K/UL — SIGNIFICANT CHANGE UP (ref 1.8–7.4)
NEUTROPHILS NFR BLD AUTO: 64.7 % — SIGNIFICANT CHANGE UP (ref 43–77)
NRBC BLD AUTO-RTO: 0 /100 WBCS — SIGNIFICANT CHANGE UP (ref 0–0)
PLATELET # BLD AUTO: 330 K/UL — SIGNIFICANT CHANGE UP (ref 150–400)
POTASSIUM SERPL-MCNC: 3.6 MMOL/L — SIGNIFICANT CHANGE UP (ref 3.5–5.3)
POTASSIUM SERPL-SCNC: 3.6 MMOL/L — SIGNIFICANT CHANGE UP (ref 3.5–5.3)
PROT SERPL-MCNC: 7.5 GM/DL — SIGNIFICANT CHANGE UP (ref 6–8.3)
PROTHROM AB SERPL-ACNC: 11.3 SEC — SIGNIFICANT CHANGE UP (ref 9.9–13.4)
RBC # BLD: 4.68 M/UL — SIGNIFICANT CHANGE UP (ref 3.8–5.2)
RBC # FLD: 15.4 % — HIGH (ref 10.3–14.5)
SODIUM SERPL-SCNC: 141 MMOL/L — SIGNIFICANT CHANGE UP (ref 135–145)
TROPONIN I, HIGH SENSITIVITY RESULT: 4.1 NG/L — SIGNIFICANT CHANGE UP
WBC # BLD: 8.8 K/UL — SIGNIFICANT CHANGE UP (ref 3.8–10.5)
WBC # FLD AUTO: 8.8 K/UL — SIGNIFICANT CHANGE UP (ref 3.8–10.5)

## 2025-06-23 PROCEDURE — 70450 CT HEAD/BRAIN W/O DYE: CPT | Mod: 26,59

## 2025-06-23 PROCEDURE — 70498 CT ANGIOGRAPHY NECK: CPT | Mod: 26

## 2025-06-23 PROCEDURE — 70496 CT ANGIOGRAPHY HEAD: CPT | Mod: 26

## 2025-06-23 PROCEDURE — 0042T: CPT

## 2025-06-23 PROCEDURE — 99285 EMERGENCY DEPT VISIT HI MDM: CPT

## 2025-06-23 NOTE — ED ADULT NURSE NOTE - OBJECTIVE STATEMENT
Pt presenst with numbness and tingling to b/l upper extremities and locked hands and then slight slurred speak at given time. LKW 1800. Stroke called at this time.

## 2025-06-23 NOTE — ED PROVIDER NOTE - OBJECTIVE STATEMENT
Attending note (Dmitriy):   - History of Present Illness: The patient, a 40  years old female, presented to the Emergency Department with a chief complaint of bilateral hand tingling and numbness, which progressed to her arms and face worse on right. The symptoms began approximately 1.5 hours prior to arrival, around 6:15 PM. The patient reported feeling disoriented, describing a sensation of spinning similar to getting off a roller coaster. She experienced difficulty walking and needed assistance moving between rooms. The patient also noted tingling in her knees, which has since decreased in intensity. She reported feeling 'weird' and 'not completely normal.' The patient was initially unable to move her hands, which were described as tightly closed, almost in a fist-like position. Her partner observed her looking drained and having difficulty standing up straight. The patient denied confusion about her location but expressed concern about the possibility of a stroke, noting that she had read about bilateral symptoms being a rare presentation. She mentioned being under significant stress recently.    - History: The patient denies any chronic medical conditions and does not take any regular medications. She reports no known allergies and denies alcohol or drug use. No past surgical history was mentioned. Family, social, and occupational history were not explicitly discussed in the provided conversation.  Patient seen initially in intake room 2; after initial assessment, upgraded to main ED, code stroke initiated given symptoms, onset; split  informed and patient handoff to Dr Conway in main ED completed verbally.

## 2025-06-23 NOTE — ED PROVIDER NOTE - PATIENT PORTAL LINK FT
You can access the FollowMyHealth Patient Portal offered by [facility] by registering at the following website: [email or web address]. By joining [employer]’s FollowMyHealth portal, you will also be able to view your health information using other applications (apps) compatible with our system.

## 2025-06-23 NOTE — ED PROVIDER NOTE - PHYSICAL EXAMINATION
Physical Examination (PE):      - General: Patient appears uncomfortable, initially unsteady on feet      - HEENT: PERRLA, EOMI with some difficulty focusing, tongue midline on protrusion      - Cardiovascular: Normal s1s2 regular      - Respiratory: clear bilaterally      - Abdomen: no pain reported.      - Neurological: Cranial nerves II-XII intact, facial symmetry preserved, no dysarthria      - Motor: 5/5 strength in upper and lower extremities bilaterally      - Sensory: Right-sided hyperesthesia noted in face, arms, and legs compared to left side      - Coordination: Some unsteadiness noted on gait examination      - Reflexes: Not explicitly mentioned Physical Examination (PE):      - General: Patient appears uncomfortable, initially unsteady on feet      - HEENT: PERRLA, EOMI with some difficulty focusing, tongue midline on protrusion      - Cardiovascular: Normal s1s2 regular      - Respiratory: clear bilaterally      - Abdomen: no pain reported.      - Neurological: Cranial nerves II-XII intact, facial symmetry preserved, no dysarthria      - Motor: 5/5 strength in upper and lower extremities bilaterally      - Sensory: Right-sided hyperesthesia noted in face, arms, and legs compared to left side      - Coordination: Some unsteadiness noted on gait examination

## 2025-06-23 NOTE — ED PROVIDER NOTE - CLINICAL SUMMARY MEDICAL DECISION MAKING FREE TEXT BOX
Attending note (Dmitriy): Acute Neurological Symptoms    - Assessment/Plan: The patient presents with bilateral upper extremity numbness and tingling, facial tingling, dizziness, and unsteadiness. While bilateral symptoms are atypical for stroke, given the acute onset and neurological nature of the symptoms, a thorough stroke workup is warranted. The improving nature of symptoms suggests a possible TIA, but other etiologies such as electrolyte imbalances or stress-induced symptoms cannot be ruled out at this time.      - Activate Code Stroke protocol       - Urgent non-contrast head CT to rule out intracranial hemorrhage or large vessel occlusion       - Complete blood count (CBC), complete metabolic panel (CMP), and coagulation studies (PT/INR, PTT)       - Finger stick glucose (already performed)       - ECG to assess for arrhythmias

## 2025-06-23 NOTE — ED ADULT NURSE NOTE - CHIEF COMPLAINT QUOTE
pt c/o bilateral hand numbness, tingling radiating up arms, lower facial tingling, disoriented x 30 min. pt states has been under stress. denies pmh

## 2025-06-23 NOTE — ED PROVIDER NOTE - PROGRESS NOTE DETAILS
Pt was seen examined by Dr. Izaguirre Pt is alert and oriented x 3 sts she has no more numbness to bilateral arms ( pt initially c/o bilateral arms numbness. ct head no acute bleed ct angio head/neck / perfusion no core infarct no LVO. 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 bedside bp 112/72

## 2025-06-23 NOTE — ED PROVIDER NOTE - CARE PROVIDER_API CALL
Anca Mcdowell  Neurology  [address] [facility] [place]  [place] [address]  Phone: [phone]  Fax: [phone]  Follow Up Time: Urgent

## 2025-06-23 NOTE — ED ADULT TRIAGE NOTE - CHIEF COMPLAINT QUOTE
pt c/o bilateral hand tingling radiating up arms, lower facial tingling, disoriented x 30 min. pt states has been under stress. denies pmh pt c/o bilateral hand numbness, tingling radiating up arms, lower facial tingling, disoriented x 30 min. pt states has been under stress. denies pmh